# Patient Record
Sex: FEMALE | Race: WHITE | NOT HISPANIC OR LATINO | Employment: OTHER | URBAN - NONMETROPOLITAN AREA
[De-identification: names, ages, dates, MRNs, and addresses within clinical notes are randomized per-mention and may not be internally consistent; named-entity substitution may affect disease eponyms.]

---

## 2021-04-28 ENCOUNTER — HOSPITAL ENCOUNTER (OUTPATIENT)
Dept: RADIOLOGY | Facility: CLINIC | Age: 66
Discharge: HOME | End: 2021-04-28
Payer: COMMERCIAL

## 2021-04-28 ENCOUNTER — OFFICE VISIT (OUTPATIENT)
Dept: OBGYN CLINIC | Facility: CLINIC | Age: 66
End: 2021-04-28
Payer: COMMERCIAL

## 2021-04-28 VITALS
SYSTOLIC BLOOD PRESSURE: 140 MMHG | BODY MASS INDEX: 42.95 KG/M2 | WEIGHT: 290 LBS | DIASTOLIC BLOOD PRESSURE: 82 MMHG | HEART RATE: 96 BPM | TEMPERATURE: 96.8 F | HEIGHT: 69 IN

## 2021-04-28 DIAGNOSIS — G89.29 CHRONIC PAIN OF RIGHT KNEE: ICD-10-CM

## 2021-04-28 DIAGNOSIS — E66.01 CLASS 3 SEVERE OBESITY DUE TO EXCESS CALORIES WITH SERIOUS COMORBIDITY AND BODY MASS INDEX (BMI) OF 40.0 TO 44.9 IN ADULT (HCC): ICD-10-CM

## 2021-04-28 DIAGNOSIS — M25.561 CHRONIC PAIN OF RIGHT KNEE: Primary | ICD-10-CM

## 2021-04-28 DIAGNOSIS — G89.29 CHRONIC RIGHT SHOULDER PAIN: ICD-10-CM

## 2021-04-28 DIAGNOSIS — M25.561 CHRONIC PAIN OF RIGHT KNEE: ICD-10-CM

## 2021-04-28 DIAGNOSIS — M25.511 CHRONIC RIGHT SHOULDER PAIN: ICD-10-CM

## 2021-04-28 DIAGNOSIS — G89.29 CHRONIC PAIN OF RIGHT KNEE: Primary | ICD-10-CM

## 2021-04-28 DIAGNOSIS — M75.01 ADHESIVE CAPSULITIS OF RIGHT SHOULDER: ICD-10-CM

## 2021-04-28 DIAGNOSIS — M17.11 PRIMARY OSTEOARTHRITIS OF RIGHT KNEE: ICD-10-CM

## 2021-04-28 PROBLEM — E66.813 CLASS 3 SEVERE OBESITY DUE TO EXCESS CALORIES WITH SERIOUS COMORBIDITY AND BODY MASS INDEX (BMI) OF 40.0 TO 44.9 IN ADULT (HCC): Status: ACTIVE | Noted: 2021-04-28

## 2021-04-28 PROCEDURE — 73564 X-RAY EXAM KNEE 4 OR MORE: CPT

## 2021-04-28 PROCEDURE — 73030 X-RAY EXAM OF SHOULDER: CPT

## 2021-04-28 PROCEDURE — 20610 DRAIN/INJ JOINT/BURSA W/O US: CPT | Performed by: ORTHOPAEDIC SURGERY

## 2021-04-28 PROCEDURE — 99204 OFFICE O/P NEW MOD 45 MIN: CPT | Performed by: ORTHOPAEDIC SURGERY

## 2021-04-28 RX ORDER — LOSARTAN POTASSIUM 50 MG/1
1 TABLET ORAL DAILY
COMMUNITY
Start: 2021-02-08

## 2021-04-28 RX ORDER — TRIAMCINOLONE ACETONIDE 40 MG/ML
40 INJECTION, SUSPENSION INTRA-ARTICULAR; INTRAMUSCULAR
Status: COMPLETED | OUTPATIENT
Start: 2021-04-28 | End: 2021-04-28

## 2021-04-28 RX ORDER — PANTOPRAZOLE SODIUM 40 MG/1
1 TABLET, DELAYED RELEASE ORAL DAILY
COMMUNITY
Start: 2021-02-11

## 2021-04-28 RX ORDER — LIDOCAINE HYDROCHLORIDE 10 MG/ML
4 INJECTION, SOLUTION INFILTRATION; PERINEURAL
Status: COMPLETED | OUTPATIENT
Start: 2021-04-28 | End: 2021-04-28

## 2021-04-28 RX ORDER — CITALOPRAM 20 MG/1
1 TABLET ORAL 2 TIMES DAILY
COMMUNITY
Start: 2021-03-15

## 2021-04-28 RX ORDER — HYDROCHLOROTHIAZIDE 12.5 MG/1
1 TABLET ORAL DAILY
COMMUNITY
Start: 2021-02-08

## 2021-04-28 RX ADMIN — LIDOCAINE HYDROCHLORIDE 4 ML: 10 INJECTION, SOLUTION INFILTRATION; PERINEURAL at 16:36

## 2021-04-28 RX ADMIN — TRIAMCINOLONE ACETONIDE 40 MG: 40 INJECTION, SUSPENSION INTRA-ARTICULAR; INTRAMUSCULAR at 16:36

## 2021-04-28 RX ADMIN — LIDOCAINE HYDROCHLORIDE 4 ML: 10 INJECTION, SOLUTION INFILTRATION; PERINEURAL at 16:35

## 2021-04-28 RX ADMIN — TRIAMCINOLONE ACETONIDE 40 MG: 40 INJECTION, SUSPENSION INTRA-ARTICULAR; INTRAMUSCULAR at 16:35

## 2021-04-28 NOTE — PROGRESS NOTES
ASSESSMENT/PLAN:    Diagnoses and all orders for this visit:    Chronic pain of right knee  -     XR knee 4+ vw right injury; Future    Chronic right shoulder pain  -     XR shoulder 2+ vw right; Future  -     Ambulatory referral to Physical Therapy; Future    Adhesive capsulitis of right shoulder  -     Ambulatory referral to Physical Therapy; Future    Primary osteoarthritis of right knee  -     Ambulatory referral to Physical Therapy; Future    Obesity      Plan:  I discussed treatment options  She elected to proceed with corticosteroid injection of both the right shoulder and right knee  These were performed without difficulty  I will see her in 2 or 3 weeks for re-evaluation  Physical therapy was recommended and a prescription provided  She will contact the office if questions or concerns arise prior to follow-up  She has previously had Visco supplement injection without significant benefit and, therefore, this would not likely the of benefit at this time  However, if she does not respond to corticosteroid injection a Visco supplement injection would be an option to discuss as her BMI is greater than 40 and she would not be a candidate for knee replacement arthroplasty  Return for 2-3 weeks  _____________________________________________________  CHIEF COMPLAINT:  Chief Complaint   Patient presents with    Right Knee - Pain    Right Shoulder - Pain         SUBJECTIVE:  Shivam Fermin is a 77y o  year old right-hand-dominant female who presents for evaluation of right knee and right shoulder pain  In regards to her right knee pain, she reports a history of pain on an intermittent basis dating back at least 10 years  She has previously been seen by Orthopedics while living in [place]  She has had corticosteroid injections with some relief and Visco supplement injection without benefit  She was told she had patellofemoral arthritis   she was placed on anti-inflammatory medications which seem to provide benefit but were discontinued secondary to peptic ulcer disease  Over the last 3 years, since anti-inflammatories were discontinued, symptoms seem to be more frequent but was still intermittent  Approximately 4 months ago, she began experiencing more consistent pain which has persisted and is now interfering with activity  She states she cannot walk more than 30 minutes without having to sit and rest due to pain  She denies pain at rest   She complains of diffuse peripatellar pain with occasional anterolateral calf pain  She does admit to a diagnosis of sciatica which makes it difficult for her to determine if the calf pain is related to her knee or her sciatic symptoms  She denies lower extremity paresthesias  She notes morning stiffness and pain  she has had no recent treatment or diagnostic studies  In regards to her right shoulder, she has had symptoms of shoulder pain for the last year  Symptoms seem to be more prominent recently  She noted a diagnosis of adhesive capsulitis approximately 10 years ago which was treated with therapy  She subsequently developed some left shoulder symptoms which she did not seek treatment for and resolved after a couple of years  Her right shoulder pain is present with activity but not present at rest   She denies any right upper extremity paresthesias  She is unaware of any injury that may have triggered her recent symptoms  She has had no treatment or diagnostic studies        PAST MEDICAL HISTORY:  Past Medical History:   Diagnosis Date    Anxiety     Arthritis     knees and feet    Depression     Hypertension     Sciatica     Stomach ulcer        PAST SURGICAL HISTORY:  Past Surgical History:   Procedure Laterality Date    APPENDECTOMY      CHOLECYSTECTOMY      GASTRIC BYPASS      HERNIA REPAIR         FAMILY HISTORY:  Family History   Problem Relation Age of Onset    Hypertension Mother     Heart attack Mother     Stroke Mother    Althea Mccloud Hypertension Father     Heart attack Father        SOCIAL HISTORY:  Social History     Tobacco Use    Smoking status: Never Smoker    Smokeless tobacco: Never Used   Substance Use Topics    Alcohol use: Yes     Frequency: Monthly or less    Drug use: Never       MEDICATIONS:    Current Outpatient Medications:     citalopram (CeleXA) 20 mg tablet, Take 1 tablet by mouth 2 (two) times a day, Disp: , Rfl:     Cyanocobalamin (VITAMIN B-12 PO), Take by mouth, Disp: , Rfl:     hydrochlorothiazide (HYDRODIURIL) 12 5 mg tablet, Take 1 tablet by mouth daily, Disp: , Rfl:     IRON-VITAMIN C PO, Take by mouth, Disp: , Rfl:     losartan (COZAAR) 50 mg tablet, Take 1 tablet by mouth daily, Disp: , Rfl:     Multiple Minerals-Vitamins (CALCIUM CITRATE PLUS PO), Take by mouth, Disp: , Rfl:     pantoprazole (PROTONIX) 40 mg tablet, Take 1 tablet by mouth daily, Disp: , Rfl:     VITAMIN D PO, Take by mouth, Disp: , Rfl:     ALLERGIES:  No Known Allergies    Review of systems:   Constitutional: Negative for fatigue, fever or loss of apetite  HENT: Negative  Respiratory: Negative for shortness of breath, dyspnea  Cardiovascular: Negative for chest pain/tightness  Gastrointestinal: Negative for abdominal pain, N/V  Endocrine: Negative for cold/heat intolerance, unexplained weight loss/gain  Genitourinary: Negative for flank pain, dysuria, hematuria  Musculoskeletal:  Positive as in the HPI   Skin: Negative for rash  Neurological:  Negative  Psychiatric/Behavioral: Negative for agitation  _____________________________________________________  PHYSICAL EXAMINATION:    Blood pressure 140/82, pulse 96, temperature (!) 96 8 °F (36 °C), temperature source Temporal, height 5' 9" (1 753 m), weight 132 kg (290 lb)      General: well developed and well nourished, alert, oriented times 3 and appears comfortable  Psychiatric: Normal  HEENT: Benign  Cardiovascular: Regular    Pulmonary: No wheezing or stridor  Abdomen: Soft, Nontender  Skin: No masses, erythema, lacerations, fluctation, ulcerations  Neurovascular: Motor and sensory exams are intact and pulses are palpable  radicular signs are absent  MUSCULOSKELETAL EXAMINATION:    The right knee exam demonstrates range of motion from 5° to 110°, primarily limited by body habitus  She denies pain during passive motion with mild pain noted during active motion  There is some mild patellofemoral crepitus appreciated  She has no tenderness over the patella, patellar tendon or tibial tubercle  The medial and lateral joint lines were nontender  Femoral condyle and tibial plateau were nontender  Collateral cruciate ligaments are stable  There is no obvious effusion  The right shoulder exam demonstrates abduction to approximately 130° without tendency toward forward flexion  She has 140° of forward flexion  She denies any pain with range of motion but does notice stretching sensation down the lateral aspect of her arm  She is able to reach the posterior portion of the iliac crest during abduction/IR verses the upper lumbar spine on the contralateral left  She has excellent strength of resisted internal and external rotation as well as abduction, flexion and extension  she denies symptoms during Gracy's testing but does complain of mild pain during [place]'s testing  Passively, I was able to abduct the arm to about 60°, forward flex to about 90°, internally rotates to 60° and externally rotate approximately 60°  She does have some apprehension during passive motion but denies significant pain except at end range abduction  _____________________________________________________  STUDIES REVIEWED:  X-rays of the right shoulder and right knee were reviewed    X-rays of the right knee demonstrate moderate to significant degenerative disease with significant narrowing of the medial joint space, lesser narrowing laterally, osteophytes and subchondral sclerosis about the tibial femoral joint  There are degenerative changes noted about the patellofemoral joint as well with osteophytes, subchondral sclerosis and joint space narrowing  The right shoulder x-ray demonstrates some degenerative disease at the acromioclavicular joint  There is some degenerative disease at the glenohumeral joint but to a lesser degree  She has some prominence noted at the tuberosity level of the proximal humerus  PROCEDURES:  Large joint arthrocentesis: R knee  Universal Protocol:  Consent: Verbal consent obtained  Consent given by: patient  Patient understanding: patient states understanding of the procedure being performed    Supporting Documentation  Indications: pain   Procedure Details  Location: knee - R knee  Needle size: 22 G  Ultrasound guidance: no  Approach: lateral  Medications administered: 4 mL lidocaine 1 %; 40 mg triamcinolone acetonide 40 mg/mL      Large joint arthrocentesis: R subacromial bursa  Universal Protocol:  Consent: Verbal consent obtained    Consent given by: patient  Patient understanding: patient states understanding of the procedure being performed    Supporting Documentation  Indications: pain   Procedure Details  Location: shoulder - R subacromial bursa  Needle size: 22 G  Approach: posterolateral  Medications administered: 4 mL lidocaine 1 %; 40 mg triamcinolone acetonide 40 mg/mL              Liana Kothari

## 2021-05-24 ENCOUNTER — OFFICE VISIT (OUTPATIENT)
Dept: OBGYN CLINIC | Facility: CLINIC | Age: 66
End: 2021-05-24
Payer: COMMERCIAL

## 2021-05-24 VITALS
WEIGHT: 290 LBS | SYSTOLIC BLOOD PRESSURE: 124 MMHG | BODY MASS INDEX: 42.95 KG/M2 | TEMPERATURE: 97 F | DIASTOLIC BLOOD PRESSURE: 78 MMHG | HEART RATE: 74 BPM | HEIGHT: 69 IN

## 2021-05-24 DIAGNOSIS — M17.11 PRIMARY OSTEOARTHRITIS OF RIGHT KNEE: Primary | ICD-10-CM

## 2021-05-24 PROCEDURE — 99213 OFFICE O/P EST LOW 20 MIN: CPT | Performed by: ORTHOPAEDIC SURGERY

## 2021-05-24 NOTE — PROGRESS NOTES
ASSESSMENT/PLAN:    Diagnoses and all orders for this visit:    Primary osteoarthritis of right knee  -     Diclofenac Sodium (VOLTAREN) 1 %; Apply 2 g topically 4 (four) times a day        Treatment options were discussed with the patient including visco injections  At this time, she does not wish to try visco as she previously tried them in [place] and did not have any pain relief with it  She will be starting PT this week  She inquired about topical NSAIDs and a prescription for Voltaren gel was provided  She was informed that this medication is now OTC and her insurance may not cover it  She was also informed that she can take up to 3000mg Tylenol per day  In regards to her sciatica symptoms, she was encouraged to seek an evaluation with Dr Aleah Garcia  We will see her back on an as needed basis  She was encouraged to contact the office should questions or concerns arise  Return if symptoms worsen or fail to improve       _____________________________________________________  CHIEF COMPLAINT:  Chief Complaint   Patient presents with    Follow-up         SUBJECTIVE:  Jess Jarquin is a 77 y o  female who presents for follow up  Of right knee osteoarthritis  She has 2 weeks status post right knee corticosteroid injection  She reports moderate relief of her knee symptoms  She does not take anything for pain on a regular basis  She is unable to take NSAIDs due to a history of gastric ulcers and gastric bypass  She reports her pain is on the medial aspect of her knee  She also experiences sciatic type symptoms which she states are occurring more frequently than not  She does not see anyone for her back  She has yet to go to physical therapy but states she has an appointment this week  She denies new injuries or trauma      PAST MEDICAL HISTORY:  Past Medical History:   Diagnosis Date    Anxiety     Arthritis     knees and feet    Depression     Hypertension     Sciatica     Stomach ulcer        PAST SURGICAL HISTORY:  Past Surgical History:   Procedure Laterality Date    APPENDECTOMY      CHOLECYSTECTOMY      GASTRIC BYPASS      HERNIA REPAIR         FAMILY HISTORY:  Family History   Problem Relation Age of Onset    Hypertension Mother     Heart attack Mother     Stroke Mother     Hypertension Father     Heart attack Father        SOCIAL HISTORY:  Social History     Tobacco Use    Smoking status: Never Smoker    Smokeless tobacco: Never Used   Substance Use Topics    Alcohol use: Yes     Frequency: Monthly or less    Drug use: Never       MEDICATIONS:    Current Outpatient Medications:     citalopram (CeleXA) 20 mg tablet, Take 1 tablet by mouth 2 (two) times a day, Disp: , Rfl:     Cyanocobalamin (VITAMIN B-12 PO), Take by mouth, Disp: , Rfl:     hydrochlorothiazide (HYDRODIURIL) 12 5 mg tablet, Take 1 tablet by mouth daily, Disp: , Rfl:     IRON-VITAMIN C PO, Take by mouth, Disp: , Rfl:     losartan (COZAAR) 50 mg tablet, Take 1 tablet by mouth daily, Disp: , Rfl:     Multiple Minerals-Vitamins (CALCIUM CITRATE PLUS PO), Take by mouth, Disp: , Rfl:     pantoprazole (PROTONIX) 40 mg tablet, Take 1 tablet by mouth daily, Disp: , Rfl:     VITAMIN D PO, Take by mouth, Disp: , Rfl:     Diclofenac Sodium (VOLTAREN) 1 %, Apply 2 g topically 4 (four) times a day, Disp: 240 g, Rfl: 0    ALLERGIES:  No Known Allergies    REVIEW OF SYSTEMS:  Pertinent items are noted in HPI  A comprehensive review of systems was negative       _____________________________________________________  PHYSICAL EXAMINATION:  General: well developed and well nourished, alert, oriented times 3 and appears comfortable  Psychiatric: Normal  HEENT:  Normocephalic, atraumatic  Cardiovascular:  Regular  Pulmonary: No wheezing or stridor  Skin: No masses, erthema, lacerations, fluctation, ulcerations  Neurovascular: Motor and sensory exams are grossly intact, distal pulses are palpable    Limb is warm and well perfused good color and capillary refill the toes  MUSCULOSKELETAL EXAMINATION:      The right knee exam demonstrates skin intact, no erythema, no ecchymosis, no significant swelling or effusion  She has tenderness to palpation over the medial femoral condyle medial tibial plateau, and medial joint line  She has full extension and flexion is to about 120°  Stable to varus and valgus stress, cruciate ligaments are grossly stable  The remainder of the right lower extremity exam is benign  _____________________________________________________  STUDIES REVIEWED:  No new imaging performed today    PROCEDURES PERFORMED:   Procedures  None performed today            Libby Dial PA-C

## 2021-05-25 ENCOUNTER — EVALUATION (OUTPATIENT)
Dept: PHYSICAL THERAPY | Facility: CLINIC | Age: 66
End: 2021-05-25
Payer: COMMERCIAL

## 2021-05-25 DIAGNOSIS — M75.01 ADHESIVE CAPSULITIS OF RIGHT SHOULDER: ICD-10-CM

## 2021-05-25 DIAGNOSIS — G89.29 CHRONIC RIGHT SHOULDER PAIN: ICD-10-CM

## 2021-05-25 DIAGNOSIS — M25.511 CHRONIC RIGHT SHOULDER PAIN: ICD-10-CM

## 2021-05-25 DIAGNOSIS — M17.11 PRIMARY OSTEOARTHRITIS OF RIGHT KNEE: ICD-10-CM

## 2021-05-25 PROCEDURE — 97162 PT EVAL MOD COMPLEX 30 MIN: CPT | Performed by: PHYSICAL THERAPIST

## 2021-05-25 PROCEDURE — 97110 THERAPEUTIC EXERCISES: CPT | Performed by: PHYSICAL THERAPIST

## 2021-05-25 NOTE — PROGRESS NOTES
PT Evaluation     Today's date: 2021  Patient name: Araceli Griffin  : 1955  MRN: [id number]  Referring provider: Vladislav Kim DO  Dx:   Encounter Diagnosis     ICD-10-CM    1  Chronic right shoulder pain  M25 511 Ambulatory referral to Physical Therapy    G89 [id number]    2  Adhesive capsulitis of right shoulder  M75 01 Ambulatory referral to Physical Therapy   3  Primary osteoarthritis of right knee  M17 11 Ambulatory referral to Physical Therapy                  Assessment  Assessment details: Araceli Griffin is a 77 y o  female presenting to PT with pain, decreased knee range of motion, decreased LE strength, balance deficits, and decreased tolerance to activity  She complains of R shoulder pain from time to time that limits her ability to dress and perform OH motions  Upon examination, frozen shoulder possible in R shoulder due to limited motion and OA in R knee  Due to these impairments, pt has difficulty with walking, stair climbing, performing ADL's, reaching OH  Patient would benefit from skilled PT services to address these impairments and to maximize function in order to improve quality of life  Thank you for the referral   Impairments: abnormal gait, abnormal or restricted ROM, impaired physical strength, lacks appropriate home exercise program and pain with function    Symptom irritability: moderateUnderstanding of Dx/Px/POC: excellent  Goals  STG  1)R knee ROM will improve >10 deg  in 3 weeks  2)R knee strength will improve 1/2 grade in 3 weeks  3) Pt will report a decrease in pain levels at its worst by 2-3 in 3 weeks  4) R shoulder ROM will improve by 5-10 deg  In all deficient planes in 3 weeks  LTG  1) Patient is independent in HEP  2) Patient will ascend/descend one flight of stairs independently with no increased pain in 6 weeks  3) Pt will be able to reach [facility] with R shoulder without increased pain or difficulty by DC    4) Pt's strength in R shoulder will be equal to contralateral side in 6 weeks  5) Pt will report no sleep disturbances due to pain in 6 weeks  6) Pt's R shoulder ROM will be equal to contralateral side by DC  Plan  Patient would benefit from: skilled physical therapy  Planned modality interventions: cryotherapy  Planned therapy interventions: breathing training, body mechanics training, massage, manual therapy, joint mobilization, abdominal trunk stabilization, balance, patient education, strengthening, flexibility, functional ROM exercises, gait training, therapeutic activities, therapeutic exercise, stretching and home exercise program  Frequency: 2x week  Duration in weeks: 4  Treatment plan discussed with: patient        Subjective Evaluation    History of Present Illness  Mechanism of injury: Pt notes that she has bene having knee pain for a few months now  She did get a Cortizone injection a few weeks ago and that has helped her pain  She notes that she is dealing with sciatic like pain down her R leg as well which is not helping her symptoms  She notes that she has been dealing with R shoulder pain for a few weeks now  She went to PT about 10 years ago for frozen shoulder and it helped, but does come back every few years  She states that [facility] motions are challenging for her and does have muscular pain in her neck and lateral arm on the right  She did get an injection in her R shoulder a few weeks ago as well, but notes it didn't really help as much  She notes that putting a bra on is difficult and there are nights that she cannot sleep on her R side  She notes that reaching behind her is challenging as well  She has trouble looking side to side, especially on her L side due to tightness in her neck  Currently, walking long distances or hills are painful for her  Stair climbing is hard for her, she does one step at a time and takes her time  She cannot extend her knee straight because she feels a lot of tightness in her knee     Quality of life: excellent    Pain  Current pain ratin  At best pain ratin  At worst pain ratin  Location: R Knee joint, under knee cap   Quality: throbbing  Relieving factors: heat and ice  Aggravating factors: standing, walking, stair climbing and sitting    Social Support  Steps to enter house: yes (1 step in)  Stairs in house: no   L[facility] in: [facility]  Lives with: spouse and parents    Employment status: not working  Hand dominance: right      Diagnostic Tests  X-ray: abnormal (arthritis in knee and shoulder )  Treatments  No previous or current treatments  Patient Goals  Patient goals for therapy: decreased pain, improved balance, increased strength and independence with ADLs/IADLs  Patient goal: "I would like to have less pain and get around a little better "        Objective     Postural Observations  Seated posture: poor  Standing posture: fair        Tenderness     Right Knee   Tenderness in the lateral joint line and medial joint line       Additional Tenderness Details  Hamstring flexibility: 75% limited on R     Cervical/Thoracic Screen   Cervical range of motion within normal limits with the following exceptions: Rotation to the L: 50% restricted  SB to the L: 50% restricted      Neurological Testing     Sensation     Shoulder   Left Shoulder   Intact: light touch    Right Shoulder   Intact: light touch    Knee   Left Knee   Intact: light touch    Right Knee   Intact: light touch     Active Range of Motion   Left Shoulder   Normal active range of motion  External rotation BTH: C4   Internal rotation BTB: T12     Right Shoulder   Flexion: 115 degrees with pain  Abduction: 110 degrees with pain  External rotation BTH: C3   Internal rotation BTB: sacrum   Left Knee   Normal active range of motion    Right Knee   Flexion: 100 (tightness) degrees   Extensor la degrees with pain    Strength/Myotome Testing     Left Shoulder   Normal muscle strength    Right Shoulder     Planes of Motion   Flexion: 4 Abduction: 4   External rotation at 90°: 4-   Internal rotation at 90°: 4     Left Hip   Planes of Motion   Flexion: 4+  Abduction: 4+  Adduction: 4+    Right Hip   Planes of Motion   Flexion: 4  Abduction: 4+  Adduction: 4+    Left Knee   Normal strength  Quadriceps contraction: good    Right Knee   Flexion: 4-  Extension: 4  Quadriceps contraction: fair    Left Ankle/Foot   Normal strength    Right Ankle/Foot   Normal strength    Tests     Right Shoulder   Positive Hawkin's  Negative drop arm and empty can  Precautions: anxiety, depression, HTN     Daily Treatment Diary:      Initial Evaluation Date: 05/25/21  Compliance 5/25                     Visit Number 1                    Re-Eval  IE                 [address]                           5/25                     Manual                      Knee PROM                      STM                      Patellar mobs                                     Ther-Ex                      Bike- warm up                      Heel slides HEP                     SAQ                      Calf stretch  HEP                     HS stretch  HEP                     bridge                      SLR- 2 ways                       clamshells                      HR/TR                      Mini squats              LAQ             HS curls              Sciatic nerve glides             Supine shoulder flexion cane HEP            Cervical ROM 4 ways HEP            Upper trap stretch  HEP                                                            Neuro Re-Ed                      Balance- TS                      Balance- narros YONATHAN                                                    Ther-Act                                                               Modalities

## 2021-05-28 DIAGNOSIS — N18.31 CHRONIC KIDNEY DISEASE, STAGE 3A (HCC): ICD-10-CM

## 2021-05-28 DIAGNOSIS — Z12.31 ENCOUNTER FOR SCREENING MAMMOGRAM FOR MALIGNANT NEOPLASM OF BREAST: ICD-10-CM

## 2021-06-01 ENCOUNTER — OFFICE VISIT (OUTPATIENT)
Dept: PHYSICAL THERAPY | Facility: CLINIC | Age: 66
End: 2021-06-01
Payer: COMMERCIAL

## 2021-06-01 DIAGNOSIS — G89.29 CHRONIC RIGHT SHOULDER PAIN: Primary | ICD-10-CM

## 2021-06-01 DIAGNOSIS — M17.11 PRIMARY OSTEOARTHRITIS OF RIGHT KNEE: ICD-10-CM

## 2021-06-01 DIAGNOSIS — M25.511 CHRONIC RIGHT SHOULDER PAIN: Primary | ICD-10-CM

## 2021-06-01 DIAGNOSIS — M75.01 ADHESIVE CAPSULITIS OF RIGHT SHOULDER: ICD-10-CM

## 2021-06-01 PROCEDURE — 97110 THERAPEUTIC EXERCISES: CPT | Performed by: PHYSICAL THERAPIST

## 2021-06-01 NOTE — PROGRESS NOTES
Daily Note     Today's date: 2021  Patient name: Yuriy Beauchamp  : 1955  MRN: [id number]  Referring provider: Drew Pat DO  Dx:   Encounter Diagnosis     ICD-10-CM    1  Chronic right shoulder pain  M25 511     G89 29    2  Adhesive capsulitis of right shoulder  M75 01    3  Primary osteoarthritis of right knee  M17 11                   Subjective: Pt notes that her shoulder and knee have been feeling better since her IE  Objective: See treatment diary below      Assessment: Tolerated treatment well  Introduced new exercises to promote strength and ROM in LE, she noted some difficulty with SLR, but was able to perform  Pt needed moderate cueing for proper form and intensity  Continue to progress exercises as see fit  Patient exhibited good technique with therapeutic exercises and would benefit from continued PT to address current limitations  Plan: Continue per plan of care  Precautions: anxiety, depression, HTN     Daily Treatment Diary:      Initial Evaluation Date: 21  Compliance                    Visit Number 1 2                   Re-Eval  IE -                MC   [employer] Captured Y -                                             Manual                      Knee PROM   arb                   STM   -                   Patellar mobs  arb           R shoulder PROM  arb                   Ther-Ex                      Bike- warm up   -                   Heel slides HEP  20x                   SAQ   2x10                   Calf stretch  HEP  -                   HS stretch  HEP  -                   bridge   -                   SLR- 2 ways    flex   15x                   clamshells   green 2x10                   HR/TR   -                   Mini squats   -           LAQ  20x           HS curls   -           Sciatic nerve glides  -           Supine shoulder flexion cane HEP 20x            Cervical ROM 4 ways HEP 10x ea           Upper trap stretch  HEP 3x30" pullys  5'            Finger ladder   10x           Shoulder squeeze  2x10 5"                   Neuro Re-Ed                      Balance- TS   -                   Balance- narros YONATHAN  -                                                  Ther-Act                                                               Modalities

## 2021-06-08 ENCOUNTER — APPOINTMENT (OUTPATIENT)
Dept: PHYSICAL THERAPY | Facility: CLINIC | Age: 66
End: 2021-06-08
Payer: COMMERCIAL

## 2021-06-14 ENCOUNTER — OFFICE VISIT (OUTPATIENT)
Dept: PHYSICAL THERAPY | Facility: CLINIC | Age: 66
End: 2021-06-14
Payer: COMMERCIAL

## 2021-06-14 DIAGNOSIS — M25.511 CHRONIC RIGHT SHOULDER PAIN: Primary | ICD-10-CM

## 2021-06-14 DIAGNOSIS — M17.11 PRIMARY OSTEOARTHRITIS OF RIGHT KNEE: ICD-10-CM

## 2021-06-14 DIAGNOSIS — M75.01 ADHESIVE CAPSULITIS OF RIGHT SHOULDER: ICD-10-CM

## 2021-06-14 DIAGNOSIS — G89.29 CHRONIC RIGHT SHOULDER PAIN: Primary | ICD-10-CM

## 2021-06-14 PROCEDURE — 97110 THERAPEUTIC EXERCISES: CPT | Performed by: PHYSICAL THERAPIST

## 2021-06-14 NOTE — PROGRESS NOTES
Daily Note     Today's date: 2021  Patient name: Anila Beltran  : 1955  MRN: [id number]  Referring provider: Dwain Peres DO  Dx:   Encounter Diagnosis     ICD-10-CM    1  Chronic right shoulder pain  M25 511     G89 29    2  Adhesive capsulitis of right shoulder  M75 01    3  Primary osteoarthritis of right knee  M17 11                   Subjective: Pt notes that her motion in her shoulder is much better, but she continues to have a lot of stiffness and pain with her R knee still  States that she thinks the Cortizone shot is starting to decrease  Objective: See treatment diary below      Assessment: Tolerated treatment well  Pt's R shoulder ROM was improved today without increased pain  Added mini squats today, she was able to perform with minimal discomfort in knee  She needed moderate cueing for proper form throughout session  Patient exhibited good technique with therapeutic exercises and would benefit from continued PT to address current limitations  Plan: Continue per plan of care  Precautions: anxiety, depression, HTN     Daily Treatment Diary:      Initial Evaluation Date: 21  Compliance                  Visit Number 1 2  3                 Re-Eval  IE -  -              MC   Foto Captured Y -  -                                         Manual                      Knee PROM   arb  def                 STM   -                   Patellar mobs  arb def          R shoulder PROM  arb  def                 Ther-Ex                      Bike- warm up   -  -                 Heel slides HEP  20x  -                 SAQ   2x10  2x10 #1                 Calf stretch  HEP  -  -                 HS stretch  HEP  -  -                 bridge   -  -                 SLR- 2 ways    flex   15x  flex 10x #1                 clamshells   green 2x10  green 2x10                 HR/TR   -                   Mini squats   - 15x at bar          LAQ  20x 20x #1          HS curls   - -          Wall slides  - 15x flex  Supine shoulder flexion cane HEP 20x  20x #1- added abduction          Wall slides   Flex   15x          Cervical ROM 4 ways HEP 10x ea 15x ea          Upper trap stretch  HEP 3x30" 3x30"          pullys  5'  5'           Finger ladder   10x 15x          Shoulder squeeze  2x10 5"  -                 Neuro Re-Ed                      Balance- TS   -                   Balance- narros YONATHAN  -                                                  Ther-Act                                                               Modalities

## 2021-06-15 ENCOUNTER — APPOINTMENT (OUTPATIENT)
Dept: PHYSICAL THERAPY | Facility: CLINIC | Age: 66
End: 2021-06-15
Payer: COMMERCIAL

## 2021-06-22 ENCOUNTER — APPOINTMENT (OUTPATIENT)
Dept: PHYSICAL THERAPY | Facility: CLINIC | Age: 66
End: 2021-06-22
Payer: COMMERCIAL

## 2021-06-24 ENCOUNTER — HOSPITAL ENCOUNTER (OUTPATIENT)
Dept: RADIOLOGY | Facility: CLINIC | Age: 66
Discharge: HOME | End: 2021-06-24
Payer: COMMERCIAL

## 2021-06-24 VITALS — HEIGHT: 69 IN | BODY MASS INDEX: 42.95 KG/M2 | WEIGHT: 290 LBS

## 2021-06-24 DIAGNOSIS — Z12.31 ENCOUNTER FOR SCREENING MAMMOGRAM FOR MALIGNANT NEOPLASM OF BREAST: ICD-10-CM

## 2021-06-24 PROCEDURE — 77063 BREAST TOMOSYNTHESIS BI: CPT

## 2021-06-24 PROCEDURE — 77067 SCR MAMMO BI INCL CAD: CPT

## 2021-06-29 ENCOUNTER — EVALUATION (OUTPATIENT)
Dept: PHYSICAL THERAPY | Facility: CLINIC | Age: 66
End: 2021-06-29
Payer: COMMERCIAL

## 2021-06-29 DIAGNOSIS — G89.29 CHRONIC RIGHT SHOULDER PAIN: Primary | ICD-10-CM

## 2021-06-29 DIAGNOSIS — M17.11 PRIMARY OSTEOARTHRITIS OF RIGHT KNEE: ICD-10-CM

## 2021-06-29 DIAGNOSIS — M25.511 CHRONIC RIGHT SHOULDER PAIN: Primary | ICD-10-CM

## 2021-06-29 DIAGNOSIS — M75.01 ADHESIVE CAPSULITIS OF RIGHT SHOULDER: ICD-10-CM

## 2021-06-29 PROCEDURE — 97110 THERAPEUTIC EXERCISES: CPT | Performed by: PHYSICAL THERAPIST

## 2021-06-29 NOTE — PROGRESS NOTES
Re-Evaluation/DC Summary     Today's date: 2021  Patient name: Claudean Maffucci  : 1955  MRN: [id number]  Referring provider: Selma Mandel DO  Dx:   Encounter Diagnosis     ICD-10-CM    1  Chronic right shoulder pain  M25 511     G89 29    2  Adhesive capsulitis of right shoulder  M75 01    3  Primary osteoarthritis of right knee  M17 11                   Subjective: Pt presents to PT today for re-evaluation for chronic R shoulder and knee pain  Pt notes that her shoulder motion, pain levels in arm, and function has improved since starting PT  She continues to have some difficulty with OH motion, but has been trying to use her R arm to do everyday activities  She states that her knee is still painful with walking, stair climbing and getting in and out of a chair  She notes that most of her pain is medially and under her knee cap  Pt is going to follow up with doctor to see if she can get another shot soon  Pt is starting another therapy in the future and will have a copay with that as well; would like to transition into an HEP at this time and DC from therapy  Objective: See treatment diary below    Goals  STG  1)R knee ROM will improve >10 deg  in 3 weeks  -met  2)R knee strength will improve 1/2 grade in 3 weeks  -met  3) Pt will report a decrease in pain levels at its worst by 2-3 in 3 weeks  -met  4) R shoulder ROM will improve by 5-10 deg  In all deficient planes in 3 weeks  - met    LTG  1) Patient is independent in HEP  -met  2) Patient will ascend/descend one flight of stairs independently with no increased pain in 6 weeks  -not met  3) Pt will be able to reach [facility] with R shoulder without increased pain or difficulty by DC -in progress  4) Pt's strength in R shoulder will be equal to contralateral side in 6 weeks  -not met  5) Pt will report no sleep disturbances due to pain in 6 weeks   -not met  6) Pt's R shoulder ROM will be equal to contralateral side by DC  - not met      Tenderness Right Knee   Tenderness in the medial joint line and below patella    Additional Tenderness Details  Hamstring flexibility: 50% limited on R     Cervical/Thoracic Screen   Motion: [place]    Neurological Testing     Sensation     Shoulder   Left Shoulder   Intact: light touch    Right Shoulder   Intact: light touch    Knee   Left Knee   Intact: light touch    Right Knee   Intact: light touch     Active Range of Motion   Left Shoulder   Normal active range of motion  External rotation BTH: C4   Internal rotation BTB: T12     Right Shoulder   Flexion: 135 degrees  Abduction: 130 degrees  External rotation BTH: C4  Internal rotation BTB: L5    Left Knee   Normal active range of motion    Right Knee   Flexion: 113 degrees   Extensor la degrees with pain    Strength/Myotome Testing     Left Shoulder   Normal muscle strength    Right Shoulder     Planes of Motion   Flexion: 4   Abduction: 4   External rotation at 90°: 4  Internal rotation at 90°: 4     Left Hip   Planes of Motion   Flexion: 4+  Abduction: 4+  Adduction: 4+    Right Hip   Planes of Motion   Flexion: 4  Abduction: 4+  Adduction: 4+    Left Knee   Normal strength  Quadriceps contraction: good    Right Knee   Flexion: 4  Extension: 4  Quadriceps contraction: fair    Left Ankle/Foot   Normal strength    Right Ankle/Foot   Normal strength    Tests     Right Shoulder   Positive Hawkin's  Negative drop arm and empty can  Assessment: Tolerated treatment well  She continues to have weakness in her R LE compared to her L side, but has improved since starting PT  Reviewed HEP with patient, no questions at this time  Pt is comfortable calling clinic with any future questions or concerns  Plan: Pt to be DC'd from formal therapy at this time and will continue HEP           Precautions: anxiety, depression, HTN     Daily Treatment Diary:      Initial Evaluation Date: 21  Compliance                Visit Number 1 2  3  4 Re-Eval  IE -  -  Y            AYANA   Foto Captured Y -  -  Y                      5/25 6/1 6/14 6/29               Manual                      Knee PROM   arb  def  -               STM   -                   Patellar mobs  arb def -         R shoulder PROM  arb  def  -               Ther-Ex                      Bike- warm up   -  -                 Heel slides HEP  20x  -  HEP               SAQ   2x10  2x10 #1 2x10 #1               Calf stretch  HEP  -  -                 HS stretch  HEP  -  -                 bridge   -  -                 SLR- 2 ways    flex  15x  flex 10x #1  flex  10x #1               clamshells   green 2x10  green 2x10  green 2x10               HR/TR   -                   Mini squats   - 15x at bar HEP         LAQ  20x 20x #1 20x #1         HS curls   - -          Supine shoulder flexion cane HEP 20x  20x #1- added abduction 20x #1flex /abd  Wall slides   Flex   15x HEP         Cervical ROM 4 ways HEP 10x ea 15x ea -         Upper trap stretch  HEP 3x30" 3x30" HEP         pullys  5'  5'  5'         Finger ladder   10x 15x 15x          rows    Green TB 2x10         Low rows    Green TB 2x10         Shoulder squeeze  2x10 5"  -                 Neuro Re-Ed                      Balance- TS   -                   Balance- narros YONATHAN  -                                                  Ther-Act                                                               Modalities

## 2021-07-21 ENCOUNTER — HOSPITAL ENCOUNTER (OUTPATIENT)
Dept: ULTRASOUND IMAGING | Facility: HOSPITAL | Age: 66
Discharge: HOME | End: 2021-07-21
Payer: COMMERCIAL

## 2021-07-21 DIAGNOSIS — N18.31 CHRONIC KIDNEY DISEASE, STAGE 3A (HCC): ICD-10-CM

## 2021-07-21 PROCEDURE — 76770 US EXAM ABDO BACK WALL COMP: CPT

## 2022-02-25 ENCOUNTER — OFFICE VISIT (OUTPATIENT)
Dept: OBGYN CLINIC | Facility: CLINIC | Age: 67
End: 2022-02-25
Payer: COMMERCIAL

## 2022-02-25 VITALS
DIASTOLIC BLOOD PRESSURE: 86 MMHG | SYSTOLIC BLOOD PRESSURE: 140 MMHG | TEMPERATURE: 96.5 F | HEART RATE: 98 BPM | BODY MASS INDEX: 42.95 KG/M2 | HEIGHT: 69 IN | WEIGHT: 290 LBS

## 2022-02-25 DIAGNOSIS — M17.11 PRIMARY OSTEOARTHRITIS OF RIGHT KNEE: Primary | ICD-10-CM

## 2022-02-25 DIAGNOSIS — G89.29 CHRONIC PAIN OF RIGHT KNEE: ICD-10-CM

## 2022-02-25 DIAGNOSIS — M25.561 CHRONIC PAIN OF RIGHT KNEE: ICD-10-CM

## 2022-02-25 PROCEDURE — 20610 DRAIN/INJ JOINT/BURSA W/O US: CPT | Performed by: ORTHOPAEDIC SURGERY

## 2022-02-25 PROCEDURE — 99213 OFFICE O/P EST LOW 20 MIN: CPT | Performed by: ORTHOPAEDIC SURGERY

## 2022-02-25 RX ORDER — TRIAMCINOLONE ACETONIDE 40 MG/ML
40 INJECTION, SUSPENSION INTRA-ARTICULAR; INTRAMUSCULAR
Status: COMPLETED | OUTPATIENT
Start: 2022-02-25 | End: 2022-02-25

## 2022-02-25 RX ORDER — LIDOCAINE HYDROCHLORIDE 10 MG/ML
4 INJECTION, SOLUTION INFILTRATION; PERINEURAL
Status: COMPLETED | OUTPATIENT
Start: 2022-02-25 | End: 2022-02-25

## 2022-02-25 RX ORDER — LOSARTAN POTASSIUM AND HYDROCHLOROTHIAZIDE 12.5; 5 MG/1; MG/1
1 TABLET ORAL DAILY
COMMUNITY
Start: 2022-02-25

## 2022-02-25 RX ADMIN — TRIAMCINOLONE ACETONIDE 40 MG: 40 INJECTION, SUSPENSION INTRA-ARTICULAR; INTRAMUSCULAR at 15:08

## 2022-02-25 RX ADMIN — LIDOCAINE HYDROCHLORIDE 4 ML: 10 INJECTION, SOLUTION INFILTRATION; PERINEURAL at 15:08

## 2022-02-25 NOTE — PROGRESS NOTES
ASSESSMENT/PLAN:    Diagnoses and all orders for this visit:    Primary osteoarthritis of right knee    Chronic pain of right knee    Other orders  -     losartan-hydrochlorothiazide (HYZAAR) 50-12 5 mg per tablet; Take 1 tablet by mouth in the morning        Plan:  Treatment options were discussed  She did elect to proceed with corticosteroid injection which was performed without difficulty  I will see her back as needed  If symptoms do not adequately improve, do not see any reason why we could not pursue Visco supplement injection  Just because she had a previous injection she does not believe provided significant benefit, there would be no reason not to pursue a Visco supplement injection as it certainly may provide relief  Knee replacement arthroplasty could be considered but her BMI is greater than 40 and she does understand she would need to lose weight, perhaps 25-30 lb might be enough  Return if symptoms worsen or fail to improve       _____________________________________________________  CHIEF COMPLAINT:  Chief Complaint   Patient presents with    Follow-up         SUBJECTIVE:  Naya Juárez is a 79y o  year old female who presents for evaluation of her right knee  She was last seen approximately 9 months ago and is now 10 months status post corticosteroid injection  She noted improvement in knee symptoms, moderate improvement overall, for about 2-3 months  She did not return for follow-up on a more urgent basis choosing to live with her symptoms  However, the pain seems to be increasing and she now believes her pain is even worse than it was prior to the injection done in April of 2021  She complains of medial-sided pain with activity  Pain will radiate down to the pes bursa and medial lower leg  She denies paresthesias    She does experience pain with ambulation but can generally push through the pain and does not have to let sit or rest   She does note start-up pain and difficulties when arising from a seated position  PAST MEDICAL HISTORY:  Past Medical History:   Diagnosis Date    Anxiety     Arthritis     knees and feet    Depression     Hypertension     Sciatica     Stomach ulcer        PAST SURGICAL HISTORY:  Past Surgical History:   Procedure Laterality Date    APPENDECTOMY      CHOLECYSTECTOMY      GASTRIC BYPASS      HERNIA REPAIR         FAMILY HISTORY:  Family History   Problem Relation Age of Onset    Hypertension Mother     Heart attack Mother     Stroke Mother     Hypertension Father     Heart attack Father     No Known Problems Maternal Grandmother     No Known Problems Maternal Grandfather     No Known Problems Paternal Grandmother     No Known Problems Paternal Grandfather     Cancer Maternal Aunt         bladder cancer    No Known Problems Paternal Aunt     No Known Problems Paternal Aunt        SOCIAL HISTORY:  Social History     Tobacco Use    Smoking status: Never Smoker    Smokeless tobacco: Never Used   Substance Use Topics    Alcohol use:  Yes    Drug use: Never       MEDICATIONS:    Current Outpatient Medications:     citalopram (CeleXA) 20 mg tablet, Take 1 tablet by mouth 2 (two) times a day, Disp: , Rfl:     Cyanocobalamin (VITAMIN B-12 PO), Take by mouth, Disp: , Rfl:     IRON-VITAMIN C PO, Take by mouth, Disp: , Rfl:     losartan-hydrochlorothiazide (HYZAAR) 50-12 5 mg per tablet, Take 1 tablet by mouth in the morning, Disp: , Rfl:     Multiple Minerals-Vitamins (CALCIUM CITRATE PLUS PO), Take by mouth, Disp: , Rfl:     pantoprazole (PROTONIX) 40 mg tablet, Take 1 tablet by mouth daily, Disp: , Rfl:     VITAMIN D PO, Take by mouth, Disp: , Rfl:     Diclofenac Sodium (VOLTAREN) 1 %, Apply 2 g topically 4 (four) times a day (Patient not taking: Reported on 2/25/2022 ), Disp: 240 g, Rfl: 0    hydrochlorothiazide (HYDRODIURIL) 12 5 mg tablet, Take 1 tablet by mouth daily (Patient not taking: Reported on 2/25/2022 ), Disp: , Rfl:    losartan (COZAAR) 50 mg tablet, Take 1 tablet by mouth daily (Patient not taking: Reported on 2/25/2022 ), Disp: , Rfl:     ALLERGIES:  No Known Allergies    Review of systems:   Constitutional: Negative for fatigue, fever or loss of apetite  HENT: Negative  Respiratory: Negative for shortness of breath, dyspnea  Cardiovascular: Negative for chest pain/tightness  Gastrointestinal: Negative for abdominal pain, N/V  Endocrine: Negative for cold/heat intolerance, unexplained weight loss/gain  Genitourinary: Negative for flank pain, dysuria, hematuria  Musculoskeletal:  Positive as in the HPI   Skin: Negative for rash  Neurological:  Negative  Psychiatric/Behavioral: Negative for agitation  _____________________________________________________  PHYSICAL EXAMINATION:    Blood pressure 140/86, pulse 98, temperature (!) 96 5 °F (35 8 °C), temperature source Temporal, height 5' 9" (1 753 m), weight 132 kg (290 lb)  General: well developed and well nourished, alert, oriented times 3 and appears comfortable  Psychiatric: Normal  HEENT: Benign  Cardiovascular: Regular    Pulmonary: No wheezing or stridor  Abdomen: Soft, Nontender  Skin: No masses, erythema, lacerations, fluctation, ulcerations  Neurovascular: Motor and sensory exams are grossly intact and pulses palpable  MUSCULOSKELETAL EXAMINATION:    The right knee exam demonstrates skin to be warm and dry  There is no visible deformity  Hypertrophy is noted  She does have some soft tissue swelling  Ligaments are grossly stable although slight laxity with valgus stress is noted would would be consistent with a mild varus deformity of the knee  She has no complaints during active and passive motion  She does have tenderness along both the medial and lateral joint line as well as over the medial femoral condyle  There is no tenderness to palpation of the pes bursa          PROCEDURES:  Large joint arthrocentesis: R knee  Universal Protocol:  Consent: Verbal consent obtained    Consent given by: patient  Patient understanding: patient states understanding of the procedure being performed    Procedure Details  Location: knee - R knee  Needle size: 22 G  Approach: lateral  Medications administered: 4 mL lidocaine 1 %; 40 mg triamcinolone acetonide 40 mg/mL              Nazia Anger

## 2022-02-27 ENCOUNTER — OFFICE VISIT (OUTPATIENT)
Dept: URGENT CARE | Facility: CLINIC | Age: 67
End: 2022-02-27
Payer: COMMERCIAL

## 2022-02-27 VITALS
SYSTOLIC BLOOD PRESSURE: 162 MMHG | HEIGHT: 69 IN | RESPIRATION RATE: 16 BRPM | DIASTOLIC BLOOD PRESSURE: 82 MMHG | WEIGHT: 290 LBS | BODY MASS INDEX: 42.95 KG/M2 | TEMPERATURE: 98 F | HEART RATE: 60 BPM | OXYGEN SATURATION: 99 %

## 2022-02-27 DIAGNOSIS — R30.9 PAINFUL URINATION: Primary | ICD-10-CM

## 2022-02-27 LAB
SL AMB  POCT GLUCOSE, UA: NEGATIVE
SL AMB LEUKOCYTE ESTERASE,UA: ABNORMAL
SL AMB POCT BILIRUBIN,UA: NEGATIVE
SL AMB POCT BLOOD,UA: ABNORMAL
SL AMB POCT CLARITY,UA: CLEAR
SL AMB POCT COLOR,UA: YELLOW
SL AMB POCT KETONES,UA: ABNORMAL
SL AMB POCT NITRITE,UA: NEGATIVE
SL AMB POCT PH,UA: 7
SL AMB POCT SPECIFIC GRAVITY,UA: 1.02
SL AMB POCT URINE PROTEIN: NEGATIVE
SL AMB POCT UROBILINOGEN: NEGATIVE

## 2022-02-27 PROCEDURE — 99213 OFFICE O/P EST LOW 20 MIN: CPT

## 2022-02-27 PROCEDURE — 87086 URINE CULTURE/COLONY COUNT: CPT

## 2022-02-27 PROCEDURE — 81002 URINALYSIS NONAUTO W/O SCOPE: CPT

## 2022-02-27 PROCEDURE — 87077 CULTURE AEROBIC IDENTIFY: CPT

## 2022-02-27 PROCEDURE — 87186 SC STD MICRODIL/AGAR DIL: CPT

## 2022-02-27 PROCEDURE — S9083 URGENT CARE CENTER GLOBAL: HCPCS

## 2022-02-27 RX ORDER — SULFAMETHOXAZOLE AND TRIMETHOPRIM 800; 160 MG/1; MG/1
1 TABLET ORAL EVERY 12 HOURS SCHEDULED
Qty: 6 TABLET | Refills: 0 | Status: SHIPPED | OUTPATIENT
Start: 2022-02-27 | End: 2022-03-02

## 2022-02-27 RX ORDER — PHENAZOPYRIDINE HYDROCHLORIDE 200 MG/1
200 TABLET, FILM COATED ORAL
Qty: 6 TABLET | Refills: 0 | Status: SHIPPED | OUTPATIENT
Start: 2022-02-27 | End: 2022-03-01

## 2022-02-27 NOTE — PROGRESS NOTES
[facility]        NAME: Ellen Ortega is a 79 y o  female  : 1955    MRN: [id number]  DATE: 2022  TIME: 5:18 PM    Assessment and Plan   Painful urination [R30 9]  1  Painful urination  POCT urine dip    Urine culture    phenazopyridine (PYRIDIUM) 200 mg tablet    sulfamethoxazole-trimethoprim (BACTRIM DS) 800-160 mg per tablet         Patient Instructions     UXC sent  Antibiotic as directed  Follow up with PCP in 3-5 days  Proceed to ER if symptoms worsen  Chief Complaint     Chief Complaint   Patient presents with    Possible UTI     pain and burning upon urination x 2 days         History of Present Illness     79 y o  F presents with complaints of dysuria, burning, frequency and urgency x 2 days  Denies hematuria  Denies back pain, fevers, or chills  States she has a history of a kidney stone 20 years ago  Not taking anything OTC  UA with mod leuks, mod ketones, large blood  Pt states she had a small cyst outside her labia that burst and was bleeding and this could have caused the abnormal blood sample  Review of Systems   Review of Systems   Constitutional: Negative for chills, fatigue and fever  HENT: Negative for congestion, ear discharge, ear pain, facial swelling, rhinorrhea, sinus pain, sore throat and trouble swallowing  Eyes: Negative for photophobia and visual disturbance  Respiratory: Negative for cough, chest tightness, shortness of breath and wheezing  Cardiovascular: Negative for chest pain, palpitations and leg swelling  Gastrointestinal: Negative for abdominal pain, diarrhea, nausea and vomiting  Genitourinary: Positive for dysuria, frequency and urgency  Negative for decreased urine volume, difficulty urinating, flank pain, hematuria and pelvic pain  Musculoskeletal: Negative for arthralgias, back pain and myalgias  Skin: Negative for pallor  Neurological: Negative for dizziness, weakness, numbness and headaches  Psychiatric/Behavioral: Negative for sleep disturbance           Current Medications       Current Outpatient Medications:     citalopram (CeleXA) 20 mg tablet, Take 1 tablet by mouth 2 (two) times a day, Disp: , Rfl:     Cyanocobalamin (VITAMIN B-12 PO), Take by mouth, Disp: , Rfl:     IRON-VITAMIN C PO, Take by mouth, Disp: , Rfl:     losartan-hydrochlorothiazide (HYZAAR) 50-12 5 mg per tablet, Take 1 tablet by mouth in the morning, Disp: , Rfl:     Multiple Minerals-Vitamins (CALCIUM CITRATE PLUS PO), Take by mouth, Disp: , Rfl:     pantoprazole (PROTONIX) 40 mg tablet, Take 1 tablet by mouth daily, Disp: , Rfl:     VITAMIN D PO, Take by mouth, Disp: , Rfl:     Diclofenac Sodium (VOLTAREN) 1 %, Apply 2 g topically 4 (four) times a day (Patient not taking: Reported on 2/25/2022 ), Disp: 240 g, Rfl: 0    hydrochlorothiazide (HYDRODIURIL) 12 5 mg tablet, Take 1 tablet by mouth daily (Patient not taking: Reported on 2/25/2022 ), Disp: , Rfl:     losartan (COZAAR) 50 mg tablet, Take 1 tablet by mouth daily (Patient not taking: Reported on 2/25/2022 ), Disp: , Rfl:     phenazopyridine (PYRIDIUM) 200 mg tablet, Take 1 tablet (200 mg total) by mouth 3 (three) times a day with meals for 2 days, Disp: 6 tablet, Rfl: 0    sulfamethoxazole-trimethoprim (BACTRIM DS) 800-160 mg per tablet, Take 1 tablet by mouth every 12 (twelve) hours for 3 days, Disp: 6 tablet, Rfl: 0    Current Allergies     Allergies as of 02/27/2022    (No Known Allergies)            The following portions of the patient's history were reviewed and updated as appropriate: allergies, current medications, past family history, past medical history, past social history, past surgical history and problem list      Past Medical History:   Diagnosis Date    Anxiety     Arthritis     knees and feet    Depression     GERD (gastroesophageal reflux disease)     Hypertension     Sciatica     Stomach ulcer        Past Surgical History:   Procedure Laterality Date    APPENDECTOMY      CHOLECYSTECTOMY      GASTRIC BYPASS      HERNIA REPAIR         Family History   Problem Relation Age of Onset    Hypertension Mother     Heart attack Mother     Stroke Mother     Hypertension Father     Heart attack Father     No Known Problems Maternal Grandmother     No Known Problems Maternal Grandfather     No Known Problems Paternal Grandmother     No Known Problems Paternal Grandfather     Cancer Maternal Aunt         bladder cancer    No Known Problems Paternal Aunt     No Known Problems Paternal Aunt          Medications have been verified  Objective   /82   Pulse 60   Temp 98 °F (36 7 °C)   Resp 16   Ht 5' 9" (1 753 m)   Wt 132 kg (290 lb)   SpO2 99%   BMI 42 83 kg/m²   No LMP recorded  Patient is postmenopausal        Physical Exam     Physical Exam  Vitals reviewed  Constitutional:       General: She is not in acute distress  Appearance: Normal appearance  She is not toxic-appearing  HENT:      Head: Normocephalic  Right Ear: Tympanic membrane normal       Left Ear: Tympanic membrane normal       Nose: No congestion or rhinorrhea  Right Sinus: No maxillary sinus tenderness or frontal sinus tenderness  Left Sinus: No maxillary sinus tenderness or frontal sinus tenderness  Mouth/Throat:      Pharynx: No oropharyngeal exudate or posterior oropharyngeal erythema  Tonsils: No tonsillar exudate  Eyes:      Pupils: Pupils are equal, round, and reactive to light  Cardiovascular:      Rate and Rhythm: Normal rate and regular rhythm  Pulses: Normal pulses  Heart sounds: Normal heart sounds  Pulmonary:      Effort: Pulmonary effort is normal       Breath sounds: Normal breath sounds  No wheezing  Abdominal:      General: Bowel sounds are normal       Palpations: Abdomen is soft  Tenderness: There is no abdominal tenderness  There is no right CVA tenderness or left CVA tenderness  Musculoskeletal:         General: Normal range of motion  Cervical back: Normal range of motion  Lymphadenopathy:      Cervical: No cervical adenopathy  Skin:     General: Skin is warm and dry  Neurological:      General: No focal deficit present  Mental Status: She is alert

## 2022-02-27 NOTE — PATIENT INSTRUCTIONS
Urinary Tract Infection in Women   Antibiotic as directed  Follow up with PCP in 3-5 days  Proceed to ER if symptoms worsen ie blood in urine, fever, chills, back pain  AMBULATORY CARE:   A urinary tract infection (UTI)  is caused by bacteria that get inside your urinary tract  Most bacteria that enter your urinary tract come out when you urinate  If the bacteria stay in your urinary tract, you may get an infection  Your urinary tract includes your kidneys, ureters, bladder, and urethra  Urine is made in your kidneys, and it flows from the ureters to the bladder  Urine leaves the bladder through the urethra  A UTI is more common in your lower urinary tract, which includes your bladder and urethra  Common symptoms include the following:   · Urinating more often or waking from sleep to urinate    · Pain or burning when you urinate    · Pain or pressure in your lower abdomen     · Urine that smells bad    · Blood in your urine    · Leaking urine    Seek care immediately if:   · You are urinating very little or not at all  · You have a high fever with shaking chills  · You have side or back pain that gets worse  Call your doctor if:   · You have a fever  · You do not feel better after 2 days of taking antibiotics  · You are vomiting  · You have questions or concerns about your condition or care  Treatment for a UTI  may include antibiotics to treat a bacterial infection  You may also need medicines to decrease pain and burning, or decrease the urge to urinate often  If you have UTIs often (called recurrent UTIs), you may be given antibiotics to take regularly  You will be given directions for when and how to use antibiotics  The goal is to prevent UTIs but not cause antibiotic resistance by using antibiotics too often  Prevent a UTI:   · Empty your bladder often  Urinate and empty your bladder as soon as you feel the need  Do not hold your urine for long periods of time      · Wipe from front to back after you urinate or have a bowel movement  This will help prevent germs from getting into your urinary tract through your urethra  · Drink liquids as directed  Ask how much liquid to drink each day and which liquids are best for you  You may need to drink more liquids than usual to help flush out the bacteria  Do not drink alcohol, caffeine, or citrus juices  These can irritate your bladder and increase your symptoms  Your healthcare provider may recommend cranberry juice to help prevent a UTI  · Urinate after you have sex  This can help flush out bacteria passed during sex  · Do not douche or use feminine deodorants  These can change the chemical balance in your vagina  · Change sanitary pads or tampons often  This will help prevent germs from getting into your urinary tract  · Talk to your healthcare provider about your birth control method  You may need to change your method if it is increasing your risk for UTIs  · Wear cotton underwear and clothes that are loose  Tight pants and nylon underwear can trap moisture and cause bacteria to grow  · Vaginal estrogen may be recommended  This medicine helps prevent UTIs in women who have gone through menopause or are in estelita-menopause  · Do pelvic muscle exercises often  Pelvic muscle exercises may help you start and stop urinating  Strong pelvic muscles may help you empty your bladder easier  Squeeze these muscles tightly for 5 seconds like you are trying to hold back urine  Then relax for 5 seconds  Gradually work up to squeezing for 10 seconds  Do 3 sets of 15 repetitions a day, or as directed  Follow up with your doctor as directed:  Write down your questions so you remember to ask them during your visits  © Copyright [employer] 2022 Information is for End User's use only and may not be sold, redistributed or otherwise used for commercial purposes   All illustrations and images included in CareNotes® are the copyrighted property of A D A M , Inc  or [facility] Hemalatha Phelps   The above information is an  only  It is not intended as medical advice for individual conditions or treatments  Talk to your doctor, nurse or pharmacist before following any medical regimen to see if it is safe and effective for you

## 2022-03-02 LAB
BACTERIA UR CULT: ABNORMAL
BACTERIA UR CULT: ABNORMAL

## 2022-06-24 ENCOUNTER — TELEPHONE (OUTPATIENT)
Dept: OTHER | Facility: OTHER | Age: 67
End: 2022-06-24

## 2022-09-06 ENCOUNTER — OFFICE VISIT (OUTPATIENT)
Dept: URGENT CARE | Facility: CLINIC | Age: 67
End: 2022-09-06
Payer: COMMERCIAL

## 2022-09-06 ENCOUNTER — APPOINTMENT (OUTPATIENT)
Dept: RADIOLOGY | Facility: CLINIC | Age: 67
End: 2022-09-06
Payer: COMMERCIAL

## 2022-09-06 VITALS
HEIGHT: 69 IN | TEMPERATURE: 98.6 F | SYSTOLIC BLOOD PRESSURE: 138 MMHG | WEIGHT: 293 LBS | HEART RATE: 72 BPM | OXYGEN SATURATION: 95 % | RESPIRATION RATE: 16 BRPM | BODY MASS INDEX: 43.4 KG/M2 | DIASTOLIC BLOOD PRESSURE: 70 MMHG

## 2022-09-06 DIAGNOSIS — S99.922A FOOT INJURY, LEFT, INITIAL ENCOUNTER: ICD-10-CM

## 2022-09-06 DIAGNOSIS — M77.52 LEFT ANKLE TENDONITIS: Primary | ICD-10-CM

## 2022-09-06 PROCEDURE — 99213 OFFICE O/P EST LOW 20 MIN: CPT | Performed by: PHYSICIAN ASSISTANT

## 2022-09-06 PROCEDURE — S9083 URGENT CARE CENTER GLOBAL: HCPCS | Performed by: PHYSICIAN ASSISTANT

## 2022-09-06 PROCEDURE — 73630 X-RAY EXAM OF FOOT: CPT

## 2022-09-06 RX ORDER — TORSEMIDE 5 MG/1
TABLET ORAL
COMMUNITY
Start: 2022-08-26

## 2022-09-06 NOTE — PROGRESS NOTES
[address] [facility]        NAME: Guido Celestin is a 79 y o  female  : 1955    MRN: [id number]  DATE: 2022  TIME: 7:06 PM    Assessment and Plan   Left ankle tendonitis [M77 52]  1  Left ankle tendonitis  XR foot 3+ vw left    Diclofenac Sodium (VOLTAREN) 1 %    Ambulatory referral to Physical Therapy    Orthopedic injury treatment         Patient Instructions   Rest, ice, compression, elevation  Cam boot  Medication as prescribed  Physical therapy  Follow-up with family doctor in 3-5 days  Go to ER symptoms become severe  Please be aware that x-rays only show bony abnormalities  It does not adequately show soft tissue, tendons, ligaments, or muscles abdnormalities  If symptoms continue, please follow-up with your family doctor or orthopedics for additional evaluation and imaging modalities  Follow up with PCP in 3-5 days  Proceed to  ER if symptoms worsen  Chief Complaint     Chief Complaint   Patient presents with    Foot Injury     PT states she was walking to her car and she heard what she describes as a "snap" in her left foot  She states she had some swelling in that foot for approximately 10 days  Her PCP is aware of the swelling and she has been trying to get an appt with her PCP  History of Present Illness       Patient is a 26-year-old female with no significant past medical history presents the office complaining of left ankle pain  States she was walking and felt and heard a pop in her ankle with pain  She has associated swelling  Pain is rated 3/10  Reports difficulty with ambulation  Denies any prior significant injuries to the foot  Review of Systems   Review of Systems   Musculoskeletal: Positive for arthralgias and joint swelling  Neurological: Negative for numbness           Current Medications       Current Outpatient Medications:     citalopram (CeleXA) 20 mg tablet, Take 1 tablet by mouth 2 (two) times a day, Disp: , Rfl:    Cyanocobalamin (VITAMIN B-12 PO), Take by mouth, Disp: , Rfl:     Diclofenac Sodium (VOLTAREN) 1 %, Apply 2 g topically 4 (four) times a day, Disp: 100 g, Rfl: 0    IRON-VITAMIN C PO, Take by mouth, Disp: , Rfl:     losartan-hydrochlorothiazide (HYZAAR) 50-12 5 mg per tablet, Take 1 tablet by mouth in the morning, Disp: , Rfl:     Multiple Minerals-Vitamins (CALCIUM CITRATE PLUS PO), Take by mouth, Disp: , Rfl:     pantoprazole (PROTONIX) 40 mg tablet, Take 1 tablet by mouth daily, Disp: , Rfl:     torsemide (DEMADEX) 5 MG tablet, TAKE 1/2 TABLET BY MOUTH DAILY X 1 WEEK THEN AS NEEDED FOR EDEMA, Disp: , Rfl:     VITAMIN D PO, Take by mouth, Disp: , Rfl:     hydrochlorothiazide (HYDRODIURIL) 12 5 mg tablet, Take 1 tablet by mouth daily (Patient not taking: No sig reported), Disp: , Rfl:     losartan (COZAAR) 50 mg tablet, Take 1 tablet by mouth daily (Patient not taking: No sig reported), Disp: , Rfl:     Current Allergies     Allergies as of 09/06/2022 - Reviewed 09/06/2022   Allergen Reaction Noted    Medical tape Rash 12/11/2006    Wound dressing adhesive Rash 09/06/2022            The following portions of the patient's history were reviewed and updated as appropriate: allergies, current medications, past family history, past medical history, past social history, past surgical history and problem list      Past Medical History:   Diagnosis Date    Anxiety     Arthritis     knees and feet    Depression     GERD (gastroesophageal reflux disease)     Hypertension     Sciatica     Stomach ulcer        Past Surgical History:   Procedure Laterality Date    APPENDECTOMY      CHOLECYSTECTOMY      GASTRIC BYPASS      HERNIA REPAIR         Family History   Problem Relation Age of Onset    Hypertension Mother     Heart attack Mother     Stroke Mother     Hypertension Father     Heart attack Father     No Known Problems Maternal Grandmother     No Known Problems Maternal Grandfather     No Known Problems Paternal Grandmother     No Known Problems Paternal Grandfather     Cancer Maternal Aunt         bladder cancer    No Known Problems Paternal Aunt     No Known Problems Paternal Aunt          Medications have been verified  Objective   /70   Pulse 72   Temp 98 6 °F (37 °C)   Resp 16   Ht 5' 9" (1 753 m)   Wt 136 kg (300 lb)   SpO2 95%   BMI 44 30 kg/m²   No LMP recorded  Patient is postmenopausal        Physical Exam     Physical Exam  Vitals and nursing note reviewed  Constitutional:       Appearance: She is well-developed  HENT:      Head: Normocephalic and atraumatic  Right Ear: External ear normal       Left Ear: External ear normal       Nose: Nose normal    Eyes:      General: Lids are normal       Conjunctiva/sclera: Conjunctivae normal    Musculoskeletal:      Left ankle: Swelling (Lateral aspect) present  No ecchymosis  Tenderness present over the CF ligament and posterior TF ligament  No lateral malleolus, medial malleolus, ATF ligament, AITF ligament, base of 5th metatarsal or proximal fibula tenderness  Normal range of motion  Anterior drawer test negative  Normal pulse  Left Achilles Tendon: Normal    Skin:     General: Skin is warm and dry  Capillary Refill: Capillary refill takes less than 2 seconds  Findings: No rash  Neurological:      Mental Status: She is alert  Left foot x-ray:  No evidence of acute osseous abnormalities  Radiology interpretation pending  Orthopedic injury treatment    Date/Time: 9/6/2022 6:58 PM  Performed by: Cameron Dennis PA-C  Authorized by: Cameron Dennis PA-C     Patient Location:  Bedside  [place] Protocol:  Procedure performed by: (LARRY Muniz)  Consent: Verbal consent obtained    Risks and benefits: risks, benefits and alternatives were discussed  Consent given by: patient  Patient understanding: patient states understanding of the procedure being performed  Patient consent: the patient's understanding of the procedure matches consent given  Patient identity confirmed: verbally with patient      Injury location:  Ankle  Location details:  Left ankle  Injury type: Soft tissue  Neurovascular status: Neurovascularly intact    Distal perfusion: normal    Neurological function: normal    Range of motion: normal    Immobilization: Cam boot    Neurovascular status: Neurovascularly intact    Distal perfusion: normal    Neurological function: normal    Range of motion: unchanged    Patient tolerance:  Patient tolerated the procedure well with no immediate complications

## 2022-09-06 NOTE — PATIENT INSTRUCTIONS
Rest, ice, compression, elevation  Cam boot  Medication as prescribed  Physical therapy  Follow-up with family doctor in 3-5 days  Go to ER symptoms become severe  Please be aware that x-rays only show bony abnormalities  It does not adequately show soft tissue, tendons, ligaments, or muscles abdnormalities  If symptoms continue, please follow-up with your family doctor or orthopedics for additional evaluation and imaging modalities

## 2022-09-06 NOTE — LETTER
September 6, 2022     Patient: Rico Kauffman   YOB: 1955   Date of Visit: 9/6/2022       To Whom It May Concern: It is my medical opinion that Rico Kauffman may return to work on 09-  If you have any questions or concerns, please don't hesitate to call           Sincerely,        Ashanti Calderon PA-C    CC: No Recipients

## 2022-10-28 ENCOUNTER — HOSPITAL ENCOUNTER (OUTPATIENT)
Dept: RADIOLOGY | Facility: CLINIC | Age: 67
End: 2022-10-28
Payer: COMMERCIAL

## 2022-10-28 ENCOUNTER — OFFICE VISIT (OUTPATIENT)
Dept: OBGYN CLINIC | Facility: CLINIC | Age: 67
End: 2022-10-28

## 2022-10-28 VITALS
SYSTOLIC BLOOD PRESSURE: 132 MMHG | OXYGEN SATURATION: 92 % | DIASTOLIC BLOOD PRESSURE: 100 MMHG | HEART RATE: 84 BPM | BODY MASS INDEX: 34.97 KG/M2 | TEMPERATURE: 97.3 F | WEIGHT: 236.8 LBS

## 2022-10-28 DIAGNOSIS — M25.561 CHRONIC PAIN OF RIGHT KNEE: ICD-10-CM

## 2022-10-28 DIAGNOSIS — G89.29 CHRONIC PAIN OF RIGHT KNEE: Primary | ICD-10-CM

## 2022-10-28 DIAGNOSIS — M25.561 CHRONIC PAIN OF RIGHT KNEE: Primary | ICD-10-CM

## 2022-10-28 DIAGNOSIS — M54.50 CHRONIC RIGHT-SIDED LOW BACK PAIN WITHOUT SCIATICA: ICD-10-CM

## 2022-10-28 DIAGNOSIS — M47.896 OTHER SPONDYLOSIS, LUMBAR REGION: ICD-10-CM

## 2022-10-28 DIAGNOSIS — G89.29 CHRONIC PAIN OF RIGHT KNEE: ICD-10-CM

## 2022-10-28 DIAGNOSIS — M17.11 PRIMARY OSTEOARTHRITIS OF RIGHT KNEE: ICD-10-CM

## 2022-10-28 DIAGNOSIS — G89.29 CHRONIC RIGHT-SIDED LOW BACK PAIN WITHOUT SCIATICA: ICD-10-CM

## 2022-10-28 PROCEDURE — 72100 X-RAY EXAM L-S SPINE 2/3 VWS: CPT

## 2022-10-28 PROCEDURE — 73562 X-RAY EXAM OF KNEE 3: CPT

## 2022-10-28 NOTE — PROGRESS NOTES
ASSESSMENT/PLAN:    Problem List Items Addressed This Visit        Musculoskeletal and Integument    Primary osteoarthritis of right knee    Relevant Orders    XR knee 3 vw right non injury    Other spondylosis, lumbar region       Other    Chronic pain of right knee - Primary    Relevant Orders    XR knee 3 vw right non injury    Chronic right-sided low back pain without sciatica    Relevant Orders    XR spine lumbar 2 or 3 views injury    Ambulatory referral to Pain Management          X-rays taken today were reviewed with the patient, which revealed severe tricompartmental degenerative changes of the right knee, as well as significant multilevel degenerative changes of the lumbar spine with facet sclerosis and reversal of lumbar lordosis  Discussion of ongoing management of her knee discussed with the patient in detail  The patient expressed her desire to proceed with a total knee replacement now that her BMI is within acceptable limits  The patient noted that she would like to wait, however, until after the holidays to schedule the surgery  She will return to the office as needed once she is ready to further discuss and schedule surgery for her right knee  In regards to her low back pain, the patient was provided with a referral for Dr Walter Loja [facility] for further evaluation and management  Return for  will call       _____________________________________________________  CHIEF COMPLAINT:  No chief complaint on file  SUBJECTIVE:  Ga Tabor is a 79 y o  female who presents for follow up of her right knee  The patient was last seen in regards to her knee on 2/25/2022, where she received a CSI  The patient states that the CSI had only provided her with a few weeks of relief  She notes ongoing, unchanged pain along the anteromedial aspect of the knee  The patient denies any new injuries or traumas, and denies any changes in activity   The patient states that she has started to use a cane in the past few months due to the pain  The pain worsens with ambulation and activity  She notes that she is not able to go shopping anymore without using a motorized shopping cart  She denies any numbness or tingling in the lower extremity  She admits to clicking and popping in the knee, but denies any locking or catching  The patient states that she has now tried and failed conservative management with cortisone injections, gel injections, bracing, and home exercises  She states that she has been working on healthy weight loss  Today the patient also complains of right-sided low back pain  She admits to a history of chronic low back pain, though states that usually the episodes of back pain resolve in a few days  Her most recent episode has persisted now for 2 weeks  When asked where the pain is located, she points along the right buttock  The patient denies any radiating pain, numbness, or tingling  She states that the pain worsens with getting up from a seated position or lying down  The patient has not seen any other provider for her symptoms  Patient does admit to a past history of right-sided sciatica, though states that she has not had a flare in many years and that this pain feels different          PAST MEDICAL HISTORY:  Past Medical History:   Diagnosis Date   • Anxiety    • Arthritis     knees and feet   • Depression    • GERD (gastroesophageal reflux disease)    • Hypertension    • Sciatica    • Stomach ulcer        PAST SURGICAL HISTORY:  Past Surgical History:   Procedure Laterality Date   • APPENDECTOMY     • CHOLECYSTECTOMY     • GASTRIC BYPASS     • HERNIA REPAIR         FAMILY HISTORY:  Family History   Problem Relation Age of Onset   • Hypertension Mother    • Heart attack Mother    • Stroke Mother    • Hypertension Father    • Heart attack Father    • No Known Problems Maternal Grandmother    • No Known Problems Maternal Grandfather    • No Known Problems Paternal Grandmother    • No Known Problems Paternal Grandfather    • Cancer Maternal Aunt         bladder cancer   • No Known Problems Paternal Aunt    • No Known Problems Paternal Aunt        SOCIAL HISTORY:  Social History     Tobacco Use   • Smoking status: Never Smoker   • Smokeless tobacco: Never Used   Vaping Use   • Vaping Use: Never used   Substance Use Topics   • Alcohol use: Yes   • Drug use: Never       MEDICATIONS:    Current Outpatient Medications:   •  citalopram (CeleXA) 20 mg tablet, Take 1 tablet by mouth 2 (two) times a day, Disp: , Rfl:   •  Cyanocobalamin (VITAMIN B-12 PO), Take by mouth, Disp: , Rfl:   •  Diclofenac Sodium (VOLTAREN) 1 %, Apply 2 g topically 4 (four) times a day, Disp: 100 g, Rfl: 0  •  IRON-VITAMIN C PO, Take by mouth, Disp: , Rfl:   •  losartan-hydrochlorothiazide (HYZAAR) 50-12 5 mg per tablet, Take 1 tablet by mouth in the morning, Disp: , Rfl:   •  Multiple Minerals-Vitamins (CALCIUM CITRATE PLUS PO), Take by mouth, Disp: , Rfl:   •  pantoprazole (PROTONIX) 40 mg tablet, Take 1 tablet by mouth daily, Disp: , Rfl:   •  VITAMIN D PO, Take by mouth, Disp: , Rfl:   •  hydrochlorothiazide (HYDRODIURIL) 12 5 mg tablet, Take 1 tablet by mouth daily (Patient not taking: No sig reported), Disp: , Rfl:   •  losartan (COZAAR) 50 mg tablet, Take 1 tablet by mouth daily (Patient not taking: No sig reported), Disp: , Rfl:   •  torsemide (DEMADEX) 5 MG tablet, TAKE 1/2 TABLET BY MOUTH DAILY X 1 WEEK THEN AS NEEDED FOR EDEMA (Patient not taking: Reported on 10/28/2022), Disp: , Rfl:     ALLERGIES:  Allergies   Allergen Reactions   • Medical Tape Rash     Silicones, Other reaction(s): Erythema     • Wound Dressing Adhesive Rash     Tape adhesive causes a reaction       REVIEW OF SYSTEMS:  Pertinent items are noted in HPI    A comprehensive review of systems was negative       _____________________________________________________  PHYSICAL EXAMINATION:  General: well developed and well nourished, alert, oriented times 3 and appears comfortable  Psychiatric: Normal  HEENT:  Normocephalic, atraumatic  Cardiovascular:  Regular  Pulmonary: No wheezing or stridor  Skin: No masses, erthema, lacerations, fluctation, ulcerations  Neurovascular: strong distal pulses, sensory and motor testing intact     MUSCULOSKELETAL EXAMINATION:    Right knee:  - patient presents with cane for ambulatory assistance  She is able to ambulate well on her own with a mild antalgic gait  - there is a mild palpable effusion present, no visible lesions, erythema, ecchymosis, warmth, or other signs of infection  - patient does complain of palpable tenderness along the medial joint line, she also notes tenderness radiating down the medial tibial shaft  The patient denies any tenderness along the patella, lateral joint line, femoral condyles, tibial plateau, or posterior aspect of the knee  - mild varus deformity correctable to neutral  - active knee range of motion 0-120 degrees without complaint  - positive patellofemoral crepitation with active and passive range of motion  - knee stable to varus and valgus stress  - negative anterior/posterior drawer, negative Lachman's  - negative Angel's  - 5/5 strength with resisted knee flexion/extension  - soft lower extremity compartments  - sensation intact to light touch distally  - lower extremity is well perfused    Lumbar spine:  - skin without lesions, effusion, ecchymosis, erythema, warmth, swelling or other signs of infection  No visible or palpable deformities  - patient complains of pain to palpation directly over the SI joint  No tenderness along the buttock, sciatic notch  She also notes tenderness palpation along the right paraspinal muscles, and mild tenderness palpation of the lumbar spine    - patient is able to perform a straight leg raise, though notes pain along the right-sided low back  - positive Jean Pierre's test with pain elicited in the lower back  - intact active and passive range of motion of the hip without complaint      _____________________________________________________  STUDIES REVIEWED:  X-rays of the right knee taken today in office demonstrate severe tricompartmental osteoarthritis with bone-on-bone changes noted along the medial compartment, osteophyte formation and subchondral sclerosis as well as nearly complete loss of the lateral joint space and patellofemoral joint spaces with associated subchondral sclerosis and osteophytes  X-rays of the lumbar spine taken in office demonstrate reversal of lumbar lordosis, significant multilevel degenerative changes, facet sclerosis and osteophyte formation              Karrie Barker

## 2022-10-31 ENCOUNTER — TELEPHONE (OUTPATIENT)
Dept: OBGYN CLINIC | Facility: HOSPITAL | Age: 67
End: 2022-10-31

## 2022-10-31 NOTE — TELEPHONE ENCOUNTER
Caller: patient  Doctor: Christopher Rosa    Reason for call: patient's weight recorded incorrectly in chart, would like to spk to the clinical team regarding the offer of surgery      Call back#: [phone]

## 2022-11-04 ENCOUNTER — CONSULT (OUTPATIENT)
Dept: PAIN MEDICINE | Facility: CLINIC | Age: 67
End: 2022-11-04

## 2022-11-04 VITALS
BODY MASS INDEX: 43.4 KG/M2 | TEMPERATURE: 98.4 F | OXYGEN SATURATION: 96 % | DIASTOLIC BLOOD PRESSURE: 80 MMHG | WEIGHT: 293 LBS | SYSTOLIC BLOOD PRESSURE: 138 MMHG | HEIGHT: 69 IN | HEART RATE: 89 BPM

## 2022-11-04 DIAGNOSIS — M54.50 CHRONIC RIGHT-SIDED LOW BACK PAIN WITHOUT SCIATICA: ICD-10-CM

## 2022-11-04 DIAGNOSIS — F32.0 CURRENT MILD EPISODE OF MAJOR DEPRESSIVE DISORDER WITHOUT PRIOR EPISODE (HCC): ICD-10-CM

## 2022-11-04 DIAGNOSIS — N18.31 CHRONIC KIDNEY DISEASE, STAGE 3A (HCC): ICD-10-CM

## 2022-11-04 DIAGNOSIS — M53.3 SACROILIAC JOINT DYSFUNCTION OF RIGHT SIDE: ICD-10-CM

## 2022-11-04 DIAGNOSIS — G89.29 CHRONIC RIGHT-SIDED LOW BACK PAIN WITHOUT SCIATICA: ICD-10-CM

## 2022-11-04 DIAGNOSIS — M47.816 LUMBAR SPONDYLOSIS: Primary | ICD-10-CM

## 2022-11-04 RX ORDER — METHYLPREDNISOLONE ACETATE 80 MG/ML
80 INJECTION, SUSPENSION INTRA-ARTICULAR; INTRALESIONAL; INTRAMUSCULAR; PARENTERAL; SOFT TISSUE ONCE
OUTPATIENT
Start: 2022-11-04 | End: 2022-11-04

## 2022-11-04 RX ORDER — LIDOCAINE HYDROCHLORIDE 10 MG/ML
5 INJECTION, SOLUTION EPIDURAL; INFILTRATION; INTRACAUDAL; PERINEURAL ONCE
OUTPATIENT
Start: 2022-11-04 | End: 2022-11-04

## 2022-11-04 RX ORDER — BUPIVACAINE HCL/PF 2.5 MG/ML
4 VIAL (ML) INJECTION ONCE
OUTPATIENT
Start: 2022-11-04 | End: 2022-11-04

## 2022-11-04 NOTE — PATIENT INSTRUCTIONS
Core Strengthening Exercises   WHAT YOU NEED TO KNOW:   Your core includes the muscles of your lower back, hip, pelvis, and abdomen  Core strengthening exercises help heal and strengthen these muscles  This helps prevent another injury, and keeps your pelvis, spine, and hips in the correct position  DISCHARGE INSTRUCTIONS:   Contact your healthcare provider if:   You have sharp or worsening pain during exercise or at rest     You have questions or concerns about your shoulder exercises  Safety tips:  Talk to your healthcare provider before you start an exercise program  A physical therapist can teach you how to do core strengthening exercises safely  Do the exercises on a mat or firm surface  A firm surface will support your spine and prevent low back pain  Do not do these exercises on a bed  Move slowly and smoothly  Avoid fast or jerky motions  Stop if you feel pain  Core exercises should not be painful  Stop if you feel pain  Breathe normally during core exercises  Do not hold your breath  This may cause an increase in blood pressure and prevent muscle strengthening  Your healthcare provider will tell you when to inhale and exhale during the exercise  Begin all of your exercises with abdominal bracing  Abdominal bracing helps warm up your core muscles  You can also practice abdominal bracing throughout the day  Lie on your back with your knees bent and feet flat on the floor  Place your arms in a relaxed position beside your body  Tighten your abdominal muscles  Pull your belly button in and up toward your spine  Hold for 5 seconds  Relax your muscles  Repeat 10 times  Core strengthening exercises: Your healthcare provider will tell you how often to do these exercises  The provider will also tell you how many repetitions of each exercise you should do  Hold each exercise for 5 seconds or as directed  As you get stronger, increase your hold to 10 to 15 seconds   You can do some of these exercises on a stability ball, or with a weight  Ask your healthcare provider how to use a stability ball or weight for these exercises:  Bridging:  Lie on your back with your knees bent and feet flat on the floor  Rest your arms at your side  Tighten your buttocks, and then lift your hips 1 inch off the floor  Hold for 5 seconds  When you can do this exercise without pain for 10 seconds, increase the distance you lift your hips  A good goal is to be able to lift your hips so that your shoulders, hips, and knees are in a straight line  Dead bug:  Lie on your back with your knees bent and feet flat on the floor  Place your arms in a relaxed position beside your body  Begin with abdominal bracing  Next, raise one leg, keeping your knee bent  Hold for 5 seconds  Repeat with the other leg  When you can do this exercise without pain for 10 to 15 seconds, you may raise one straight leg and hold  Repeat with the other leg  Quadruped:  Place your hands and knees on the floor  Keep your wrists directly below your shoulders and your knees directly below your hips  Pull your belly button in toward your spine  Do not flatten or arch your back  Tighten your abdominal muscles below your belly button  Hold for 5 seconds  When you can do this exercise without pain for 10 to 15 seconds, you may extend one arm and hold  Repeat on the other side  Side bridge exercises:      Standing side bridge:  Stand next to a wall and extend one arm toward the wall  Place your palm flat on the wall with your fingers pointing upward  Begin with abdominal bracing  Next, without moving your feet, slowly bend your arm to 90 degrees  Hold for 5 seconds  Repeat on the other side  When you can do this exercise without pain for 10 to 15 seconds, you may do the bent leg side bridge on the floor  Bent leg side bridge:  Lie on one side with your legs, hips, and shoulders in a straight line   Prop yourself up onto your forearm so your elbow is directly below your shoulder  Bend your knees back to 90 degrees  Begin with abdominal bracing  Next, lift your hips and balance yourself on your forearm and knees  Hold for 5 seconds  Repeat on the other side  When you can do this exercise without pain for 10 to 15 seconds, you may do the straight leg side bridge on the floor  Straight leg side bridge:  Lie on one side with your legs, hips, and shoulders in a straight line  Prop yourself up onto your forearm so your elbow is directly below your shoulder  Begin with abdominal bracing  Lift your hips off the floor and balance yourself on your forearm and the outside of your flexed foot  Do not let your ankle bend sideways  Hold for 5 seconds  Repeat on the other side  When you can do this exercise without pain for 10 to 15 seconds, ask your healthcare provider for more advanced exercises  Superman:  Lie on your stomach  Extend your arms forward on the floor  Tighten your abdominal muscles and lift your right hand and left leg off the floor  Hold this position  Slowly return to the starting position  Tighten your abdominal muscles and lift your left hand and right leg off the floor  Hold this position  Slowly return to the starting position  Clam:  Lie on your side with your knees bent  Put your bottom arm under your head to keep your neck in line  Put your top hand on your hip to keep your pelvis from moving  Put your heels together, and keep them together during this exercise  Slowly raise your top knee toward the ceiling  Then lower your leg so your knees are together  Repeat this exercise 10 times  Then switch sides and do the exercise 10 times with the other leg  Curl up:  Lie on your back with your knees bent and feet flat on the floor  Place your hands, palms down, underneath your lower back  Next, with your elbows on the floor, lift your shoulders and chest 2 to 3 inches off the floor   Keep your head in line with your shoulders  Hold this position  Slowly return to the starting position  Straight leg raises:  Lie on your back with one leg straight  Bend the other knee and place your foot flat on the floor  Tighten your abdominal muscles  Keep your leg straight and slowly lift it straight up 6 to 12 inches off the floor  Hold this position  Lower your leg slowly  Do as many repetitions as directed on this side  Repeat with the other leg  Plank:  Lie on your stomach  Bend your elbows and place your forearms flat on the floor  Lift your chest, stomach, and knees off the floor  Make sure your elbows are below your shoulders  Your body should be in a straight line  Do not let your hips or lower back sink to the ground  Squeeze your abdominal muscles together and hold for 15 seconds  To make this exercise harder, hold for 30 seconds or lift 1 leg at a time  Bicycles:  Lie on your back  Bend both knees and bring them toward your chest  Your calves should be parallel to the floor  Place the palms of your hands on the back of your head  Straighten your right leg and keep it lifted 2 inches off the floor  Raise your head and shoulders off the floor and twist towards your left  Keep your head and shoulders lifted  Bend your right knee while you straighten your left leg  Keep your left leg 2 inches off the floor  Twist your head and chest towards the left leg  Continue to straighten 1 leg at a time and twist        Follow up with your doctor as directed:  Write down your questions so you remember to ask them during your visits  © Copyright [employer] 2022 Information is for End User's use only and may not be sold, redistributed or otherwise used for commercial purposes  All illustrations and images included in CareNotes® are the copyrighted property of [employer]  or [facility] Hemalatha Phelps   The above information is an  only   It is not intended as medical advice for individual conditions or treatments  Talk to your doctor, nurse or pharmacist before following any medical regimen to see if it is safe and effective for you

## 2022-11-04 NOTE — PROGRESS NOTES
Assessment  1  Lumbar spondylosis  -     Ambulatory referral to Physical Therapy; Future    2  Chronic right-sided low back pain without sciatica  -     Ambulatory referral to Pain Management    3  Sacroiliac joint dysfunction of right side  -     Ambulatory referral to Physical Therapy; Future  -     Case request operating room: BLOCK / INJECTION SACROILIAC; Standing  -     Case request operating room: BLOCK / INJECTION SACROILIAC    4  Current mild episode of major depressive disorder without prior episode ([facility] [address] )    5  Chronic kidney disease, stage 3a ([facility] [address] )    6  BMI 40 0-44 9, adult (HCC)    Right sided axial low back pain described primarily by arthritic features  Aching, nagging, indolent, stabbing, throbbing features in axial low back without radicular components  5/5 strength bilaterally, negative SLR  Positive facet loading maneuvers in lumbar spine elicited pain, positive tenderness to palpation over lumbar paraspinal muscles  +YANE's, +GAENSLEN's, +SIJ loading right sided; +SUSAN FINGER test right sided  Findings correlate with prominent lumbar facet arthropathy seen throughout axial low back on x-ray  Currently she is neurologically intact without gait instability, saddle anesthesia or bowel/bladder abnormality  Risks, benefits alternative to SIJ injection, medial branch blocks and subsequent radiofrequency ablation of successful thoroughly discussed with patient  Handouts provided questions answered to patient satisfaction  Plan  -Right SIJ injection; f/u 2 weeks post procedure  -script provided for formal physical therapy for sacroiliac joint dysfunction, lumbar facet arthropathy; Physician directed home exercise plan as per [facility] demonstrated and handouts provided that patient plans to participate with for 1 hour, twice a week for the next 6 weeks  There are risks associated with opioid medications, including dependence, addiction and tolerance   The patient understands and agrees to use these medications only as prescribed  Potential side effects of the medications include, but are not limited to, constipation, drowsiness, addiction, impaired judgment and risk of fatal overdose if not taken as prescribed  The patient was warned against driving while taking sedation medications or operating heavy machinery  The patient voiced understanding  Sharing medications is a felony  At this point in time, the patient is showing no signs of addiction, abuse, diversion or suicidal ideation  [place] Prescription Drug Monitoring Program report was reviewed and was appropriate      Complete risks and benefits including bleeding, infection, tissue reaction, nerve injury and allergic reaction were discussed  The approach was demonstrated using models and literature was provided  Verbal and written consent was obtained  My impressions and treatment recommendations were discussed in detail with the patient who verbalized understanding and had no further questions  Discharge instructions were provided  I personally saw and examined the patient and I agree with the above discussed plan of care  No orders of the defined types were placed in this encounter  History of Present Illness    Orvel [place] is a 79 y o  female with pmhx of obesity, depression, GERD, CKD presenting with chronic low back pain described primarily as arthritic in nature  She describes 8/10 low back pain that is worse in the mornings and worse at the end of the day  Pain is particularly worse in right low axial back  The pain is characterized by achy, nagging, indolent, crampy, stabbing pain in her axial low back  The patient describes that the pain is worse with standing for long periods of time on hard surfaces as well as with walking  The patient is a very active individual and feels as though this pain compromises her participation with independent activities of daily living   The pain can be debilitating at times and contribute to significant disability, compromising overall activity and independent activities of daily living  She has not yet trialed formal PT  Medications the patient has tried in the past include celexa, OTC tylenol  She describes no radicular symptoms and has good strength  She denies any weakness numbness or paresthesias  The patient denies any bowel or bladder dysfunction as well, saddle anesthesia or gait instability  I have personally reviewed and/or updated the patient's past medical history, past surgical history, family history, social history, current medications, allergies, and vital signs today  Review of Systems   Constitutional: Positive for activity change  HENT: Negative  Eyes: Negative  Respiratory: Negative  Cardiovascular: Negative  Gastrointestinal: Negative  Endocrine: Negative  Genitourinary: Negative  Musculoskeletal: Positive for arthralgias, back pain, gait problem and myalgias  Skin: Negative  Allergic/Immunologic: Negative  Neurological: Negative for weakness and numbness  Hematological: Negative  Psychiatric/Behavioral: Negative  All other systems reviewed and are negative        Patient Active Problem List   Diagnosis   • Chronic pain of right knee   • Chronic right shoulder pain   • Adhesive capsulitis of right shoulder   • Primary osteoarthritis of right knee   • Class 3 severe obesity due to excess calories with serious comorbidity and body mass index (BMI) of 40 0 to 44 9 in [facility])   • Chronic right-sided low back pain without sciatica   • Other spondylosis, lumbar region   • Current mild episode of major depressive disorder without prior episode ([facility] [address] )   • Chronic kidney disease, stage 3a ([facility] [address] )       Past Medical History:   Diagnosis Date   • Anxiety    • Arthritis     knees and feet   • Depression    • GERD (gastroesophageal reflux disease)    • Hypertension    • Sciatica    • Stomach ulcer        Past Surgical History: Procedure Laterality Date   • APPENDECTOMY     • CHOLECYSTECTOMY     • GASTRIC BYPASS     • HERNIA REPAIR         Family History   Problem Relation Age of Onset   • Hypertension Mother    • Heart attack Mother    • Stroke Mother    • Hypertension Father    • Heart attack Father    • No Known Problems Maternal Grandmother    • No Known Problems Maternal Grandfather    • No Known Problems Paternal Grandmother    • No Known Problems Paternal Grandfather    • Cancer Maternal Aunt         bladder cancer   • No Known Problems Paternal Aunt    • No Known Problems Paternal Aunt        Social History     Occupational History   • Not on file   Tobacco Use   • Smoking status: Never Smoker   • Smokeless tobacco: Never Used   Vaping Use   • Vaping Use: Never used   Substance and Sexual Activity   • Alcohol use: Yes   • Drug use: Never   • Sexual activity: Not on file       Current Outpatient Medications on File Prior to Visit   Medication Sig   • citalopram (CeleXA) 20 mg tablet Take 1 tablet by mouth 2 (two) times a day   • Cyanocobalamin (VITAMIN B-12 PO) Take by mouth   • Diclofenac Sodium (VOLTAREN) 1 % Apply 2 g topically 4 (four) times a day   • IRON-VITAMIN C PO Take by mouth   • losartan-hydrochlorothiazide (HYZAAR) 50-12 5 mg per tablet Take 1 tablet by mouth in the morning   • Multiple Minerals-Vitamins (CALCIUM CITRATE PLUS PO) Take by mouth   • pantoprazole (PROTONIX) 40 mg tablet Take 1 tablet by mouth daily   • torsemide (DEMADEX) 5 MG tablet prn   • VITAMIN D PO Take by mouth     No current facility-administered medications on file prior to visit         Allergies   Allergen Reactions   • Medical Tape Rash     Silicones, Other reaction(s): Erythema     • Wound Dressing Adhesive Rash     Tape adhesive causes a reaction         Physical Exam    /80 (BP Location: Right arm, Patient Position: Sitting, Cuff Size: Standard)   Pulse 89   Temp 98 4 °F (36 9 °C)   Ht 5' 9" (1 753 m)   Wt 135 kg (298 lb)   SpO2 96%   BMI 44 01 kg/m²     Constitutional: normal, well developed, well nourished, alert, in no distress and non-toxic and no overt pain behavior  and obese  Eyes: anicteric  HEENT: grossly intact  Neck: supple, symmetric, trachea midline and no masses   Pulmonary:even and unlabored  Cardiovascular:No edema or pitting edema present  Skin:Normal without rashes or lesions and well hydrated  Psychiatric:Mood and affect appropriate  Neurologic:Cranial Nerves II-XII grossly intact Sensation grossly intact; no clonus negative nunez's  Reflexes 2+ and brisk  SLR negative bilaterally  Musculoskeletal: antalgic gait, ambulates with cane  5/5 strength bilaterally with AROM in all extremities  Unable to perform normal heel toe and tip toe walking  Significant pain with lumbar facet loading bilaterally and with lateral spine rotation  ttp over lumbar paraspinal muscles  Positive right sided kassy's test, gaenslen's, SIJ loading right sided  Imaging    LUMBAR SPINE     INDICATION:   M54 50: Low back pain, unspecified  G89 29: Other chronic pain      COMPARISON:  None     VIEWS:  XR SPINE LUMBAR 2 OR 3 VIEWS INJURY        FINDINGS:     There are 5 non rib bearing lumbar vertebral bodies      No evidence of acute fracture      Schmorl's node at T11      Degenerative spondylosis is seen in multiple levels disc space narrowing most pronounced at L4-5 and L5-S1      Grade 1 retrolisthesis of L3 as compared to L4 and L4 as compared to L5      Facet hypertrophy is most pronounced at L5-S1 with sclerosis       No significant lumbar degenerative change noted      The pedicles appear intact  Surgical clips are noted in the right upper quadrant        IMPRESSION:     Degenerative spondylosis as above    No compression deformity         Workstation performed: [id number]

## 2022-11-04 NOTE — H&P (VIEW-ONLY)
Assessment  1  Lumbar spondylosis  -     Ambulatory referral to Physical Therapy; Future    2  Chronic right-sided low back pain without sciatica  -     Ambulatory referral to Pain Management    3  Sacroiliac joint dysfunction of right side  -     Ambulatory referral to Physical Therapy; Future  -     Case request operating room: BLOCK / INJECTION SACROILIAC; Standing  -     Case request operating room: BLOCK / INJECTION SACROILIAC    4  Current mild episode of major depressive disorder without prior episode ([facility] [place] [address] )    5  Chronic kidney disease, stage 3a ([facility] [place] [address] )    6  BMI 40 0-44 9, adult (HCC)    Right sided axial low back pain described primarily by arthritic features  Aching, nagging, indolent, stabbing, throbbing features in axial low back without radicular components  5/5 strength bilaterally, negative SLR  Positive facet loading maneuvers in lumbar spine elicited pain, positive tenderness to palpation over lumbar paraspinal muscles  +YANE's, +GAENSLEN's, +SIJ loading right sided; +SUSAN FINGER test right sided  Findings correlate with prominent lumbar facet arthropathy seen throughout axial low back on x-ray  Currently she is neurologically intact without gait instability, saddle anesthesia or bowel/bladder abnormality  Risks, benefits alternative to SIJ injection, medial branch blocks and subsequent radiofrequency ablation of successful thoroughly discussed with patient  Handouts provided questions answered to patient satisfaction  Plan  -Right SIJ injection; f/u 2 weeks post procedure  -script provided for formal physical therapy for sacroiliac joint dysfunction, lumbar facet arthropathy; Physician directed home exercise plan as per [facility] demonstrated and handouts provided that patient plans to participate with for 1 hour, twice a week for the next 6 weeks  There are risks associated with opioid medications, including dependence, addiction and tolerance   The patient understands and agrees to use these medications only as prescribed  Potential side effects of the medications include, but are not limited to, constipation, drowsiness, addiction, impaired judgment and risk of fatal overdose if not taken as prescribed  The patient was warned against driving while taking sedation medications or operating heavy machinery  The patient voiced understanding  Sharing medications is a felony  At this point in time, the patient is showing no signs of addiction, abuse, diversion or suicidal ideation  [place] Prescription Drug Monitoring Program report was reviewed and was appropriate      Complete risks and benefits including bleeding, infection, tissue reaction, nerve injury and allergic reaction were discussed  The approach was demonstrated using models and literature was provided  Verbal and written consent was obtained  My impressions and treatment recommendations were discussed in detail with the patient who verbalized understanding and had no further questions  Discharge instructions were provided  I personally saw and examined the patient and I agree with the above discussed plan of care  No orders of the defined types were placed in this encounter  History of Present Illness    Kristofer Doran is a 79 y o  female with pmhx of obesity, depression, GERD, CKD presenting with chronic low back pain described primarily as arthritic in nature  She describes 8/10 low back pain that is worse in the mornings and worse at the end of the day  Pain is particularly worse in right low axial back  The pain is characterized by achy, nagging, indolent, crampy, stabbing pain in her axial low back  The patient describes that the pain is worse with standing for long periods of time on hard surfaces as well as with walking  The patient is a very active individual and feels as though this pain compromises her participation with independent activities of daily living   The pain can be debilitating at times and contribute to significant disability, compromising overall activity and independent activities of daily living  She has not yet trialed formal PT  Medications the patient has tried in the past include celexa, OTC tylenol  She describes no radicular symptoms and has good strength  She denies any weakness numbness or paresthesias  The patient denies any bowel or bladder dysfunction as well, saddle anesthesia or gait instability  I have personally reviewed and/or updated the patient's past medical history, past surgical history, family history, social history, current medications, allergies, and vital signs today  Review of Systems   Constitutional: Positive for activity change  HENT: Negative  Eyes: Negative  Respiratory: Negative  Cardiovascular: Negative  Gastrointestinal: Negative  Endocrine: Negative  Genitourinary: Negative  Musculoskeletal: Positive for arthralgias, back pain, gait problem and myalgias  Skin: Negative  Allergic/Immunologic: Negative  Neurological: Negative for weakness and numbness  Hematological: Negative  Psychiatric/Behavioral: Negative  All other systems reviewed and are negative        Patient Active Problem List   Diagnosis   • Chronic pain of right knee   • Chronic right shoulder pain   • Adhesive capsulitis of right shoulder   • Primary osteoarthritis of right knee   • Class 3 severe obesity due to excess calories with serious comorbidity and body mass index (BMI) of 40 0 to 44 9 in [facility])   • Chronic right-sided low back pain without sciatica   • Other spondylosis, lumbar region   • Current mild episode of major depressive disorder without prior episode ([facility] [address] )   • Chronic kidney disease, stage 3a ([facility] [address] )       Past Medical History:   Diagnosis Date   • Anxiety    • Arthritis     knees and feet   • Depression    • GERD (gastroesophageal reflux disease)    • Hypertension    • Sciatica    • Stomach ulcer        Past Surgical History: Procedure Laterality Date   • APPENDECTOMY     • CHOLECYSTECTOMY     • GASTRIC BYPASS     • HERNIA REPAIR         Family History   Problem Relation Age of Onset   • Hypertension Mother    • Heart attack Mother    • Stroke Mother    • Hypertension Father    • Heart attack Father    • No Known Problems Maternal Grandmother    • No Known Problems Maternal Grandfather    • No Known Problems Paternal Grandmother    • No Known Problems Paternal Grandfather    • Cancer Maternal Aunt         bladder cancer   • No Known Problems Paternal Aunt    • No Known Problems Paternal Aunt        Social History     Occupational History   • Not on file   Tobacco Use   • Smoking status: Never Smoker   • Smokeless tobacco: Never Used   Vaping Use   • Vaping Use: Never used   Substance and Sexual Activity   • Alcohol use: Yes   • Drug use: Never   • Sexual activity: Not on file       Current Outpatient Medications on File Prior to Visit   Medication Sig   • citalopram (CeleXA) 20 mg tablet Take 1 tablet by mouth 2 (two) times a day   • Cyanocobalamin (VITAMIN B-12 PO) Take by mouth   • Diclofenac Sodium (VOLTAREN) 1 % Apply 2 g topically 4 (four) times a day   • IRON-VITAMIN C PO Take by mouth   • losartan-hydrochlorothiazide (HYZAAR) 50-12 5 mg per tablet Take 1 tablet by mouth in the morning   • Multiple Minerals-Vitamins (CALCIUM CITRATE PLUS PO) Take by mouth   • pantoprazole (PROTONIX) 40 mg tablet Take 1 tablet by mouth daily   • torsemide (DEMADEX) 5 MG tablet prn   • VITAMIN D PO Take by mouth     No current facility-administered medications on file prior to visit         Allergies   Allergen Reactions   • Medical Tape Rash     Silicones, Other reaction(s): Erythema     • Wound Dressing Adhesive Rash     Tape adhesive causes a reaction         Physical Exam    /80 (BP Location: Right arm, Patient Position: Sitting, Cuff Size: Standard)   Pulse 89   Temp 98 4 °F (36 9 °C)   Ht 5' 9" (1 753 m)   Wt 135 kg (298 lb)   SpO2 96%   BMI 44 01 kg/m²     Constitutional: normal, well developed, well nourished, alert, in no distress and non-toxic and no overt pain behavior  and obese  Eyes: anicteric  HEENT: grossly intact  Neck: supple, symmetric, trachea midline and no masses   Pulmonary:even and unlabored  Cardiovascular:No edema or pitting edema present  Skin:Normal without rashes or lesions and well hydrated  Psychiatric:Mood and affect appropriate  Neurologic:Cranial Nerves II-XII grossly intact Sensation grossly intact; no clonus negative nunez's  Reflexes 2+ and brisk  SLR negative bilaterally  Musculoskeletal: antalgic gait, ambulates with cane  5/5 strength bilaterally with AROM in all extremities  Unable to perform normal heel toe and tip toe walking  Significant pain with lumbar facet loading bilaterally and with lateral spine rotation  ttp over lumbar paraspinal muscles  Positive right sided kassy's test, gaenslen's, SIJ loading right sided  Imaging    LUMBAR SPINE     INDICATION:   M54 50: Low back pain, unspecified  G89 29: Other chronic pain      COMPARISON:  None     VIEWS:  XR SPINE LUMBAR 2 OR 3 VIEWS INJURY        FINDINGS:     There are 5 non rib bearing lumbar vertebral bodies      No evidence of acute fracture      Schmorl's node at T11      Degenerative spondylosis is seen in multiple levels disc space narrowing most pronounced at L4-5 and L5-S1      Grade 1 retrolisthesis of L3 as compared to L4 and L4 as compared to L5      Facet hypertrophy is most pronounced at L5-S1 with sclerosis       No significant lumbar degenerative change noted      The pedicles appear intact  Surgical clips are noted in the right upper quadrant        IMPRESSION:     Degenerative spondylosis as above    No compression deformity         Workstation performed: [id number]

## 2022-11-14 ENCOUNTER — EVALUATION (OUTPATIENT)
Dept: PHYSICAL THERAPY | Facility: CLINIC | Age: 67
End: 2022-11-14

## 2022-11-14 DIAGNOSIS — M47.816 LUMBAR SPONDYLOSIS: ICD-10-CM

## 2022-11-14 DIAGNOSIS — M53.3 SACROILIAC JOINT DYSFUNCTION OF RIGHT SIDE: ICD-10-CM

## 2022-11-14 NOTE — PROGRESS NOTES
PT Evaluation     Today's date: 2022  Patient name: Kait Kent  : 1955  MRN: [id number]  Referring provider: Alvarado Garcia MD  Dx:   Encounter Diagnosis     ICD-10-CM    1  Lumbar spondylosis  M47 816 Ambulatory referral to Physical Therapy     PT plan of care cert/re-cert   2  Sacroiliac joint dysfunction of right side  M53 3 Ambulatory referral to Physical Therapy     PT plan of care cert/re-cert       Start Time: 1600  Stop Time: 1700  Total time in clinic (min): 60 minutes    Assessment  Assessment details: Pt presents to PT with exacerbation of chronic Hx of LBP  Significant findings from examination include: decreased/painful Lumbar ROM, (-) neurodynamic testing, R knee hypomobility, decreased functional mobility and transfer ability, and decreased spinal stabilizer mm activation  These findings are consistent with chronic non-specific LBP, limiting their ADL performance and (I) functional mobility  Pt would benefit from course of PT to resolve the above mentioned impairments and facilitate return to PLOF       Impairments: abnormal gait, abnormal or restricted ROM, activity intolerance, impaired physical strength and lacks appropriate home exercise program    Symptom irritability: moderateUnderstanding of Dx/Px/POC: good   Prognosis: good    Goals  Short Term Functional Goals:   In 4 weeks patient will:   Pt will report 3/10 Lumbar pain on VAS to allow work tasks at desk    Improve functional mobility: Increased sitting and standing tolerance to 30 and 10 minutes respectively before changing position    Patient to be independent with HEP to maximize improvement in functional mobility    pt will increase FOTO score by 10pts to reflect a statistically significant improvement     ?   Long Term Functional Goals:    In 6 weeks patient will:   -Pt will report 2/10 Lumbar pain on VAS to allow home and self-care tasks    -Patient will demonstrate [facility] pain-free Lumbar motion to better complete ADLs    -Improve functional mobility:  Patient will be able to walk and establish exercise program    -The patient will go for 30 minutes walks 3-4 times per week and stretch daily to improve functional mobility and activity tolerance    -Pt will score at or above predicted discharge FOTO score of 54 to reflect improvement in subjective functional ability  Plan  Patient would benefit from: skilled physical therapy  Referral necessary: No  Planned therapy interventions: manual therapy, neuromuscular re-education, patient education, therapeutic activities, therapeutic exercise and home exercise program  Frequency: 2x week  Duration in weeks: 6  Plan of Care beginning date: 2022  Plan of Care expiration date: 2022  Treatment plan discussed with: patient        Subjective Evaluation    History of Present Illness  Date of onset: 10/1/2022  Mechanism of injury: Sonia Restrepo reports Chronic Hx of lumbar pain that started increasing 6-8 weeks ago  CC at this time is constant dull ache  Occ pain wrapping around the R hip to groin  Symptoms aggravated by prolonged positioning  Symptoms improved by ice an repositioning  Conservative treatment to this point includes: meds and ice     Symptoms change throughout the day:  No   Red Flags: No   Relevant Surgical Hx: no  Mechanism of injury: n/a  Positional preference:  No   Symptoms distal to glute fold: Yes (Ant Thigh)   No            Not a recurrent problem   Quality of life: good    Pain  Current pain rating: 3  At best pain ratin  At worst pain ratin  Relieving factors: ice  Progression: worsening    Social Support  Steps to enter house: yes  6  Stairs in house: no   L[facility] in: [facility]  Lives with: spouse    Employment status: working  Patient Goals  Patient goals for therapy: decreased pain, increased strength, independence with ADLs/IADLs and increased motion          Objective     Concurrent Complaints  Negative for night pain, disturbed sleep, bladder dysfunction, bowel dysfunction and saddle (S4) numbness    Neurological Testing     Reflexes   Left   Patellar (L4): normal (2+)  Achilles (S1): normal (2+)    Right   Patellar (L4): normal (2+)  Achilles (S1): normal (2+)    Active Range of Motion     Lumbar   Flexion:  Restriction level: minimal  Extension:  Restriction level: maximal  Left lateral flexion:  Restriction level: maximal  Right lateral flexion:  Restriction level: maximal  Left rotation:  Restriction level: moderate  Right rotation:  Restriction level: moderate  Left Knee   Flexion: 130 degrees   Extension: -5 degrees     Right Knee   Flexion: 125 degrees   Extension: -20 degrees     Strength/Myotome Testing     Lumbar   Left   Normal strength    Right Hip   Planes of Motion   Flexion: WFL  Extension: WFL    Right Knee   Flexion: 4-  Extension: WFL    Right Ankle/Foot   Dorsiflexion: WFL  Plantar flexion: WFL       Tests     Lumbar     Left   Negative passive SLR and slump test      Right   Negative passive SLR and slump test               Precautions: none     Daily Treatment Diary:      Initial Evaluation Date: 11/14/22  Compliance 11/14                     Visit Number 1                    Re-Eval  IE                 MC   Foto Captured y                           11/14                     Manual                      L/S STM                      Piriformis TpR                      LAD             L/S Mobs                                   Ther-Ex                      Active Warm-up                      LTR x10                     SKTC                      Hooklying ADD x10 5"                     Piriformis Stretch             Clamshells                      90/90 stretch                      Supine Bridge 10x5"                     Parag pose                                                                  Neuro Re-Ed                      TrA brace c Uni march 10x5"                     BKFO             TrA [employer] 3-way Ther-Act                                                               Modalities                      IFC with P

## 2022-11-17 ENCOUNTER — OFFICE VISIT (OUTPATIENT)
Dept: PHYSICAL THERAPY | Facility: CLINIC | Age: 67
End: 2022-11-17

## 2022-11-17 DIAGNOSIS — M53.3 SACROILIAC JOINT DYSFUNCTION OF RIGHT SIDE: Primary | ICD-10-CM

## 2022-11-17 DIAGNOSIS — M47.816 LUMBAR SPONDYLOSIS: ICD-10-CM

## 2022-11-21 ENCOUNTER — OFFICE VISIT (OUTPATIENT)
Dept: PHYSICAL THERAPY | Facility: CLINIC | Age: 67
End: 2022-11-21

## 2022-11-21 DIAGNOSIS — M53.3 SACROILIAC JOINT DYSFUNCTION OF RIGHT SIDE: ICD-10-CM

## 2022-11-21 DIAGNOSIS — M47.816 LUMBAR SPONDYLOSIS: Primary | ICD-10-CM

## 2022-11-21 NOTE — PROGRESS NOTES
Daily Note     Today's date: 2022  Patient name: Apple Lewis  : 1955  MRN: [id number]  Referring provider: Rodriguez Gray MD  Dx:   Encounter Diagnosis     ICD-10-CM    1  Lumbar spondylosis  M47 816       2  Sacroiliac joint dysfunction of right side  M53 3           Start Time: 1600  Stop Time: 1645  Total time in clinic (min): 45 minutes    Subjective: Pt reports she has increased pain over the weekend due to not perf HEP  Difficulty again c transfers  Pain reported as 3/10 at start of session  Objective: See treatment diary below  Assessment:  Apple Lewis was progressed with PT interventions, focusing on appropriate technique and intensity  She req extensive VC for proper tech and perf  They remain limited with Abdominal mm activation  Pt would benefit from continued skilled PT to resolve remaining functional impairments and facilitate return to PLOF  Plan: Continue per plan of care  Progress treatment as tolerated         Precautions: none     Daily Treatment Diary:      Initial Evaluation Date: 22  Compliance                  Visit Number 1 2  3                 Re-Eval  IE                 MC   Foto Captured y                                            Manual                      L/S STM                      Piriformis TpR                      LAD             L/S Mobs                                   Ther-Ex                      Active Warm-up                      LTR x10  x10  x20  x20                 SKTC                      Hooklying ADD x10 5"  2x10 5" ea  2x10 5" ea                 Piriformis Stretch             Clamshells   Grn 2x10 5" ea  Grn 2x10 5" ea                 90/90 stretch                      Supine Bridge 10x5"  2x10 5"  Grn 2x10 5"                  Hooklying ABD  Alberto 2x10 5"  Alberto 2x10 5"                 Standing Hip ABD/Ext   2x10 ea (B)  2x10 ea (B)                                       Neuro Re-Ed TrA brace c Uni march 10x5"  10x5"  10x5"                 BKFO             TrA Ball Press 3-way  Straight only 2x10 5" ea   Straight only 2x10 5" eaz                                    Ther-Act                                                               Modalities                      IFC with P

## 2022-11-22 ENCOUNTER — HOSPITAL ENCOUNTER (OUTPATIENT)
Facility: HOSPITAL | Age: 67
Setting detail: OUTPATIENT SURGERY
Discharge: HOME | End: 2022-11-22
Attending: ANESTHESIOLOGY | Admitting: ANESTHESIOLOGY

## 2022-11-22 ENCOUNTER — APPOINTMENT (OUTPATIENT)
Dept: RADIOLOGY | Facility: HOSPITAL | Age: 67
End: 2022-11-22

## 2022-11-22 VITALS
TEMPERATURE: 97.9 F | HEART RATE: 90 BPM | RESPIRATION RATE: 20 BRPM | SYSTOLIC BLOOD PRESSURE: 124 MMHG | OXYGEN SATURATION: 90 % | BODY MASS INDEX: 43.4 KG/M2 | WEIGHT: 293 LBS | DIASTOLIC BLOOD PRESSURE: 65 MMHG | HEIGHT: 69 IN

## 2022-11-22 RX ORDER — ALPRAZOLAM 0.5 MG/1
0.5 TABLET ORAL ONCE
Status: COMPLETED | OUTPATIENT
Start: 2022-11-22 | End: 2022-11-22

## 2022-11-22 RX ORDER — BUPIVACAINE HCL/PF 2.5 MG/ML
4 VIAL (ML) INJECTION ONCE
Status: DISCONTINUED | OUTPATIENT
Start: 2022-11-22 | End: 2022-11-22 | Stop reason: HOSPADM

## 2022-11-22 RX ORDER — METHYLPREDNISOLONE ACETATE 80 MG/ML
80 INJECTION, SUSPENSION INTRA-ARTICULAR; INTRALESIONAL; INTRAMUSCULAR; PARENTERAL; SOFT TISSUE ONCE
Status: COMPLETED | OUTPATIENT
Start: 2022-11-22 | End: 2022-11-22

## 2022-11-22 RX ORDER — LIDOCAINE HYDROCHLORIDE 10 MG/ML
5 INJECTION, SOLUTION EPIDURAL; INFILTRATION; INTRACAUDAL; PERINEURAL ONCE
Status: COMPLETED | OUTPATIENT
Start: 2022-11-22 | End: 2022-11-22

## 2022-11-22 RX ADMIN — ALPRAZOLAM 0.5 MG: 0.5 TABLET ORAL at 09:44

## 2022-11-22 NOTE — DISCHARGE INSTRUCTIONS
YOUR 2 WEEK FOLLOW UP HAS BEEN SCHEDULED; IF YOU WISH TO CHANGE THE FOLLOW UP, PLEASE CALL THE [facility] AT [facility]: [phone]    Sacroiliac Joint Injection   WHAT YOU NEED TO KNOW:   A sacroiliac (SI) joint injection is done to diagnose or treat pain from sacroiliac joint syndrome  The pain caused by this syndrome may be felt in your lower back, buttocks, groin, and your thigh  DISCHARGE INSTRUCTIONS:   Seek care immediately if:   You have a fever  You have increased redness, or swelling around the injection site  You have drainage from the injection site  You are not able to walk or move your leg  Contact your healthcare provider if:   Your pain does not get better within 5 days  You have new symptoms  You have questions or concerns about your condition or care  Self-care:   Drink liquids as directed  Liquids will help flush the contrast material out of your body  Ask how much liquid to drink and which liquids are best for you  Apply ice to your injection site  Ice helps decrease swelling and pain  Use an ice pack, or put crushed ice in a plastic bag  Cover it with a towel and place it on your low back for 15 to 20 minutes every hour or as directed  Do not take a bath or get into a hot tub after your procedure  Take a shower instead  Soaking your puncture site in a bath or hot tub increases your risk for infection  Limit physical activity that causes pain  Rest as needed  Ask your healthcare provider how long you should limit activity  Keep a pain diary  Write down when your pain happens, how severe it is, and any other symptoms you have with your pain  A diary will help you keep track of your pain  It may also help your healthcare provider find out what is causing your pain  Follow up with your healthcare provider as directed: You may need more injections or other treatments  Bring your pain diary to your visits   Write down your questions so you remember to ask them during your visits  © Copyright [employer] 2022 Information is for End User's use only and may not be sold, redistributed or otherwise used for commercial purposes  All illustrations and images included in CareNotes® are the copyrighted property of A D A M , Inc  or Gaby Gilmore  The above information is an  only  It is not intended as medical advice for individual conditions or treatments  Talk to your doctor, nurse or pharmacist before following any medical regimen to see if it is safe and effective for you

## 2022-11-22 NOTE — PROCEDURES
Pre-procedure Diagnosis: Sacroiliitis/Sacroiliac joint dysfunction  Post-procedure Diagnosis: Sacroiliitis/Sacroiliac joint dysfunction  Operation Title(s):  1  [RIGHT] Sacroiliac joint injection      2  Intraoperative fluoroscopy  Attending Surgeon:   Landon Marino MD  Anesthesia:   Local    Indications: The patient is a 79y o  year-old female with a diagnosis of RIGHT sacroiliitis/Sacroiliac joint dysfunction  The patient's history and physical exam were reviewed  The risks, benefits and alternatives to the procedure were discussed, and all questions were answered to the patient's satisfaction  The patient agreed to proceed, and written informed consent was obtained  Procedure in Detail: The patient was brought into the procedure room and placed in the prone position on the fluoroscopy table  The low back and upper buttock were prepped with chloraprep and draped in a sterile manner  AP fluoroscopy was used to visualize the [RIGHT] sacroiliac joint  The fluoroscopic beam was then obliqued until the anterior and posterior margins of the joint were aligned  The inferior margin of the joint was identified and marked  The skin and subcutaneous tissues in the area were anesthetized with 1% lidocaine  A 22-gauge, 5-inch spinal needle was advanced toward the identified point under fluoroscopic guidance  Once the targeted point was reached and the joint space was entered, negative aspiration was confirmed, and 1ml of Omnipaque 240 contrast was injected  The joint space was appropriately outlined  Then, after negative aspiration, a solution consisting of 4mL 0 25% bupivacaine and 1mL Depo-Medrol (80mg/ml) were easily injected  The needle was removed with a 1% lidocaine flush  The patient's back was cleaned and a bandage was placed over the sites of needle insertion  Disposition: The patient tolerated the procedure well and there were no apparent complications   Vital signs remained stable throughout the procedure  The patient was taken to the recovery area where written discharge instructions for the procedure were given      Estimated Blood Loss: None  Specimens Obtained: N/A

## 2022-11-22 NOTE — OP NOTE
OPERATIVE REPORT  PATIENT NAME: Dashawn Moreno    :  1955  MRN: [id number]  Pt Location:  GI ROOM 01    SURGERY DATE: 2022    Surgeon(s) and Role: Gadiel Delgado MD - Primary    Preop Diagnosis:  Sacroiliac joint dysfunction of right side [M53 3]    Post-Op Diagnosis Codes:     * Sacroiliac joint dysfunction of right side [M53 3]    Procedure(s) (LRB):  BLOCK / INJECTION SACROILIAC (Right)    Specimen(s):  * No specimens in log *    Estimated Blood Loss:   Minimal    Drains:  * No LDAs found *    Anesthesia Type:   Local    Operative Indications:  Sacroiliac joint dysfunction of right side [M53 3]    Operative Findings:  Contrast spread into right sacroiliac joint under fluoroscopic guidance      Complications:   None    Procedure and Technique:  Please see detailed procedure note    I was present for the entire procedure    Patient Disposition:  PACU     SIGNATURE: Janae Fuentes MD  DATE: 2022  TIME: 10:03 AM

## 2022-11-22 NOTE — INTERVAL H&P NOTE
H&P reviewed  After examining the patient I find no changes in the patients condition since the H&P had been written      Vitals:    11/22/22 0938   BP: 168/91   Pulse: 103   Resp: 18   Temp: 97 5 °F (36 4 °C)   SpO2: 95%

## 2022-11-23 ENCOUNTER — APPOINTMENT (OUTPATIENT)
Dept: PHYSICAL THERAPY | Facility: CLINIC | Age: 67
End: 2022-11-23

## 2022-11-28 ENCOUNTER — APPOINTMENT (OUTPATIENT)
Dept: PHYSICAL THERAPY | Facility: CLINIC | Age: 67
End: 2022-11-28

## 2022-11-29 ENCOUNTER — TELEPHONE (OUTPATIENT)
Dept: PAIN MEDICINE | Facility: CLINIC | Age: 67
End: 2022-11-29

## 2022-12-01 ENCOUNTER — OFFICE VISIT (OUTPATIENT)
Dept: URGENT CARE | Facility: CLINIC | Age: 67
End: 2022-12-01

## 2022-12-01 VITALS
DIASTOLIC BLOOD PRESSURE: 75 MMHG | HEART RATE: 78 BPM | SYSTOLIC BLOOD PRESSURE: 155 MMHG | BODY MASS INDEX: 42.51 KG/M2 | HEIGHT: 69 IN | OXYGEN SATURATION: 93 % | WEIGHT: 287 LBS | RESPIRATION RATE: 20 BRPM | TEMPERATURE: 97.5 F

## 2022-12-01 DIAGNOSIS — J06.9 VIRAL URI WITH COUGH: Primary | ICD-10-CM

## 2022-12-01 LAB
SARS-COV-2 AG UPPER RESP QL IA: NEGATIVE
VALID CONTROL: NORMAL

## 2022-12-01 RX ORDER — BENZONATATE 200 MG/1
200 CAPSULE ORAL 3 TIMES DAILY PRN
Qty: 20 CAPSULE | Refills: 0 | Status: SHIPPED | OUTPATIENT
Start: 2022-12-01 | End: 2022-12-15

## 2022-12-01 NOTE — PROGRESS NOTES
[facility]        NAME: Sherin Mendiola is a 79 y o  female  : 1955    MRN: [id number]  DATE: 2022  TIME: 2:36 PM    Assessment and Plan   Viral URI with cough [J06 9]  1  Viral URI with cough  Poct Covid 19 Rapid Antigen Test    benzonatate (TESSALON) 200 MG capsule        Advised patient to discontinue DayQuil NyQuil due to hypertension  Advised to use over-the-counter Coricidin HBP  Patient Instructions   Drink plenty of fluids  May use over the counter cold medications for symptomatic treatment  Do not use medications with Pseudoephedrine or Phenylphrine if you have high blood pressure because it may worsen your blood pressure  Follow up with your PCP in 3-5 days if your symptoms do not improve or if you have any concerns  Go to the ER if symptoms become severe  Follow up with PCP in 3-5 days  Proceed to  ER if symptoms worsen  Chief Complaint     Chief Complaint   Patient presents with   • Cold Like Symptoms     2 days ago patient started runny nose, headache, cough and sneezing  Still congested and coughing  No fevers  History of Present Illness       Patient is a 60-year-old female with significant past medical history of hypertension and CKD stage 3 presents the office complaining of headache, congestion, rhinorrhea, sore throat, and cough for 2 days  He denies fever, chills, SOB, CP, nausea, vomiting, abdominal pain or rashes  She has been using over-the-counter cold and flu medications with good relief of symptoms  Review of Systems   Review of Systems   Constitutional: Negative for chills and fever  HENT: Positive for congestion, rhinorrhea and sore throat  Respiratory: Positive for cough  Negative for shortness of breath  Cardiovascular: Negative for chest pain and palpitations  Gastrointestinal: Negative for abdominal pain, diarrhea, nausea and vomiting  Musculoskeletal: Negative for myalgias  Skin: Negative for rash     Neurological: Positive for headaches  Negative for dizziness and light-headedness           Current Medications       Current Outpatient Medications:   •  benzonatate (TESSALON) 200 MG capsule, Take 1 capsule (200 mg total) by mouth 3 (three) times a day as needed for cough for up to 14 days, Disp: 20 capsule, Rfl: 0  •  citalopram (CeleXA) 20 mg tablet, Take 1 tablet by mouth 2 (two) times a day, Disp: , Rfl:   •  Cyanocobalamin (VITAMIN B-12 PO), Take by mouth, Disp: , Rfl:   •  Diclofenac Sodium (VOLTAREN) 1 %, Apply 2 g topically 4 (four) times a day, Disp: 100 g, Rfl: 0  •  IRON-VITAMIN C PO, Take by mouth, Disp: , Rfl:   •  losartan-hydrochlorothiazide (HYZAAR) 50-12 5 mg per tablet, Take 1 tablet by mouth in the morning, Disp: , Rfl:   •  Multiple Minerals-Vitamins (CALCIUM CITRATE PLUS PO), Take by mouth, Disp: , Rfl:   •  pantoprazole (PROTONIX) 40 mg tablet, Take 1 tablet by mouth daily, Disp: , Rfl:   •  torsemide (DEMADEX) 5 MG tablet, prn, Disp: , Rfl:   •  VITAMIN D PO, Take by mouth, Disp: , Rfl:     Current Allergies     Allergies as of 12/01/2022 - Reviewed 12/01/2022   Allergen Reaction Noted   • Medical tape Rash 12/11/2006   • Wound dressing adhesive Rash 09/06/2022            The following portions of the patient's history were reviewed and updated as appropriate: allergies, current medications, past family history, past medical history, past social history, past surgical history and problem list      Past Medical History:   Diagnosis Date   • Anxiety    • Arthritis     knees and feet   • Depression    • GERD (gastroesophageal reflux disease)    • Hypertension    • Sciatica    • Stomach ulcer        Past Surgical History:   Procedure Laterality Date   • APPENDECTOMY     • CHOLECYSTECTOMY     • GASTRIC BYPASS     • HERNIA REPAIR     • ND INJECTION,SACROILIAC JOINT Right 11/22/2022    Procedure: BLOCK / INJECTION SACROILIAC;  Surgeon: Donna Renae MD;  Location: [facility];  Service: Pain Management Family History   Problem Relation Age of Onset   • Hypertension Mother    • Heart attack Mother    • Stroke Mother    • Hypertension Father    • Heart attack Father    • No Known Problems Maternal Grandmother    • No Known Problems Maternal Grandfather    • No Known Problems Paternal Grandmother    • No Known Problems Paternal Grandfather    • Cancer Maternal Aunt         bladder cancer   • No Known Problems Paternal Aunt    • No Known Problems Paternal Aunt          Medications have been verified  Objective   /75   Pulse 78   Temp 97 5 °F (36 4 °C)   Resp 20   Ht 5' 9" (1 753 m)   Wt 130 kg (287 lb)   SpO2 93%   BMI 42 38 kg/m²   No LMP recorded  Patient is postmenopausal        Physical Exam     Physical Exam  Vitals and nursing note reviewed  Constitutional:       Appearance: Normal appearance  She is well-developed  HENT:      Head: Normocephalic and atraumatic  Right Ear: Tympanic membrane, ear canal and external ear normal       Left Ear: Tympanic membrane, ear canal and external ear normal       Nose: Congestion present  Mouth/Throat:      Pharynx: Uvula midline  Eyes:      General: Lids are normal       Conjunctiva/sclera: Conjunctivae normal       Pupils: Pupils are equal, round, and reactive to light  Cardiovascular:      Rate and Rhythm: Normal rate and regular rhythm  Pulses: Normal pulses  Heart sounds: Normal heart sounds  No murmur heard  No friction rub  No gallop  Pulmonary:      Effort: Pulmonary effort is normal       Breath sounds: Normal breath sounds  No wheezing, rhonchi or rales  Musculoskeletal:         General: Normal range of motion  Cervical back: Neck supple  Lymphadenopathy:      Cervical: No cervical adenopathy  Skin:     General: Skin is warm and dry  Capillary Refill: Capillary refill takes less than 2 seconds  Neurological:      Mental Status: She is alert         POC rapid COVID-19 negative

## 2022-12-16 ENCOUNTER — OFFICE VISIT (OUTPATIENT)
Dept: PAIN MEDICINE | Facility: CLINIC | Age: 67
End: 2022-12-16

## 2022-12-16 VITALS
HEART RATE: 89 BPM | BODY MASS INDEX: 43.4 KG/M2 | WEIGHT: 293 LBS | SYSTOLIC BLOOD PRESSURE: 134 MMHG | TEMPERATURE: 98 F | DIASTOLIC BLOOD PRESSURE: 72 MMHG | HEIGHT: 69 IN | RESPIRATION RATE: 20 BRPM

## 2022-12-16 DIAGNOSIS — G89.29 CHRONIC RIGHT-SIDED LOW BACK PAIN WITHOUT SCIATICA: ICD-10-CM

## 2022-12-16 DIAGNOSIS — M54.50 CHRONIC RIGHT-SIDED LOW BACK PAIN WITHOUT SCIATICA: ICD-10-CM

## 2022-12-16 DIAGNOSIS — M47.896 OTHER SPONDYLOSIS, LUMBAR REGION: Primary | ICD-10-CM

## 2022-12-16 DIAGNOSIS — M53.3 SACROILIAC JOINT DYSFUNCTION OF RIGHT SIDE: ICD-10-CM

## 2022-12-16 NOTE — PATIENT INSTRUCTIONS
Core Strengthening Exercises   WHAT YOU NEED TO KNOW:   Your core includes the muscles of your lower back, hip, pelvis, and abdomen  Core strengthening exercises help heal and strengthen these muscles  This helps prevent another injury, and keeps your pelvis, spine, and hips in the correct position  DISCHARGE INSTRUCTIONS:   Contact your healthcare provider if:   You have sharp or worsening pain during exercise or at rest     You have questions or concerns about your shoulder exercises  Safety tips:  Talk to your healthcare provider before you start an exercise program  A physical therapist can teach you how to do core strengthening exercises safely  Do the exercises on a mat or firm surface  A firm surface will support your spine and prevent low back pain  Do not do these exercises on a bed  Move slowly and smoothly  Avoid fast or jerky motions  Stop if you feel pain  Core exercises should not be painful  Stop if you feel pain  Breathe normally during core exercises  Do not hold your breath  This may cause an increase in blood pressure and prevent muscle strengthening  Your healthcare provider will tell you when to inhale and exhale during the exercise  Begin all of your exercises with abdominal bracing  Abdominal bracing helps warm up your core muscles  You can also practice abdominal bracing throughout the day  Lie on your back with your knees bent and feet flat on the floor  Place your arms in a relaxed position beside your body  Tighten your abdominal muscles  Pull your belly button in and up toward your spine  Hold for 5 seconds  Relax your muscles  Repeat 10 times  Core strengthening exercises: Your healthcare provider will tell you how often to do these exercises  The provider will also tell you how many repetitions of each exercise you should do  Hold each exercise for 5 seconds or as directed  As you get stronger, increase your hold to 10 to 15 seconds   You can do some of these exercises on a stability ball, or with a weight  Ask your healthcare provider how to use a stability ball or weight for these exercises:  Bridging:  Lie on your back with your knees bent and feet flat on the floor  Rest your arms at your side  Tighten your buttocks, and then lift your hips 1 inch off the floor  Hold for 5 seconds  When you can do this exercise without pain for 10 seconds, increase the distance you lift your hips  A good goal is to be able to lift your hips so that your shoulders, hips, and knees are in a straight line  Dead bug:  Lie on your back with your knees bent and feet flat on the floor  Place your arms in a relaxed position beside your body  Begin with abdominal bracing  Next, raise one leg, keeping your knee bent  Hold for 5 seconds  Repeat with the other leg  When you can do this exercise without pain for 10 to 15 seconds, you may raise one straight leg and hold  Repeat with the other leg  Quadruped:  Place your hands and knees on the floor  Keep your wrists directly below your shoulders and your knees directly below your hips  Pull your belly button in toward your spine  Do not flatten or arch your back  Tighten your abdominal muscles below your belly button  Hold for 5 seconds  When you can do this exercise without pain for 10 to 15 seconds, you may extend one arm and hold  Repeat on the other side  Side bridge exercises:      Standing side bridge:  Stand next to a wall and extend one arm toward the wall  Place your palm flat on the wall with your fingers pointing upward  Begin with abdominal bracing  Next, without moving your feet, slowly bend your arm to 90 degrees  Hold for 5 seconds  Repeat on the other side  When you can do this exercise without pain for 10 to 15 seconds, you may do the bent leg side bridge on the floor  Bent leg side bridge:  Lie on one side with your legs, hips, and shoulders in a straight line   Prop yourself up onto your forearm so your elbow is directly below your shoulder  Bend your knees back to 90 degrees  Begin with abdominal bracing  Next, lift your hips and balance yourself on your forearm and knees  Hold for 5 seconds  Repeat on the other side  When you can do this exercise without pain for 10 to 15 seconds, you may do the straight leg side bridge on the floor  Straight leg side bridge:  Lie on one side with your legs, hips, and shoulders in a straight line  Prop yourself up onto your forearm so your elbow is directly below your shoulder  Begin with abdominal bracing  Lift your hips off the floor and balance yourself on your forearm and the outside of your flexed foot  Do not let your ankle bend sideways  Hold for 5 seconds  Repeat on the other side  When you can do this exercise without pain for 10 to 15 seconds, ask your healthcare provider for more advanced exercises  Superman:  Lie on your stomach  Extend your arms forward on the floor  Tighten your abdominal muscles and lift your right hand and left leg off the floor  Hold this position  Slowly return to the starting position  Tighten your abdominal muscles and lift your left hand and right leg off the floor  Hold this position  Slowly return to the starting position  Clam:  Lie on your side with your knees bent  Put your bottom arm under your head to keep your neck in line  Put your top hand on your hip to keep your pelvis from moving  Put your heels together, and keep them together during this exercise  Slowly raise your top knee toward the ceiling  Then lower your leg so your knees are together  Repeat this exercise 10 times  Then switch sides and do the exercise 10 times with the other leg  Curl up:  Lie on your back with your knees bent and feet flat on the floor  Place your hands, palms down, underneath your lower back  Next, with your elbows on the floor, lift your shoulders and chest 2 to 3 inches off the floor   Keep your head in line with your shoulders  Hold this position  Slowly return to the starting position  Straight leg raises:  Lie on your back with one leg straight  Bend the other knee and place your foot flat on the floor  Tighten your abdominal muscles  Keep your leg straight and slowly lift it straight up 6 to 12 inches off the floor  Hold this position  Lower your leg slowly  Do as many repetitions as directed on this side  Repeat with the other leg  Plank:  Lie on your stomach  Bend your elbows and place your forearms flat on the floor  Lift your chest, stomach, and knees off the floor  Make sure your elbows are below your shoulders  Your body should be in a straight line  Do not let your hips or lower back sink to the ground  Squeeze your abdominal muscles together and hold for 15 seconds  To make this exercise harder, hold for 30 seconds or lift 1 leg at a time  Bicycles:  Lie on your back  Bend both knees and bring them toward your chest  Your calves should be parallel to the floor  Place the palms of your hands on the back of your head  Straighten your right leg and keep it lifted 2 inches off the floor  Raise your head and shoulders off the floor and twist towards your left  Keep your head and shoulders lifted  Bend your right knee while you straighten your left leg  Keep your left leg 2 inches off the floor  Twist your head and chest towards the left leg  Continue to straighten 1 leg at a time and twist        Follow up with your doctor as directed:  Write down your questions so you remember to ask them during your visits  © Copyright [employer] 2022 Information is for End User's use only and may not be sold, redistributed or otherwise used for commercial purposes  All illustrations and images included in CareNotes® are the copyrighted property of [employer]  or [facility] Hemalatha Phelps   The above information is an  only   It is not intended as medical advice for individual conditions or treatments  Talk to your doctor, nurse or pharmacist before following any medical regimen to see if it is safe and effective for you

## 2022-12-16 NOTE — PROGRESS NOTES
Assessment  1  Other spondylosis, lumbar region    2  Sacroiliac joint dysfunction of right side    3  Chronic right-sided low back pain without sciatica    No significant relief of pain or improved ability to participate with IADLs after right SIJ injection  Encouraged patient to complete PT so that MRI could be ordered to better plan interventional therapies  Previously reported the following symptomatology:     Right sided axial low back pain described primarily by arthritic features  Aching, nagging, indolent, stabbing, throbbing features in axial low back without radicular components  5/5 strength bilaterally, negative SLR  Positive facet loading maneuvers in lumbar spine elicited pain, positive tenderness to palpation over lumbar paraspinal muscles  +YANE's, +GAENSLEN's, +SIJ loading right sided; +SUSAN FINGER test right sided  Findings correlate with prominent lumbar facet arthropathy seen throughout axial low back on x-ray  Currently she is neurologically intact without gait instability, saddle anesthesia or bowel/bladder abnormality  Risks, benefits alternative to SIJ injection, medial branch blocks and subsequent radiofrequency ablation of successful thoroughly discussed with patient  Handouts provided questions answered to patient satisfaction  Plan  -f/u after PT course  -encouraged patient to discuss cymbalta with pcp  -script provided for formal physical therapy for sacroiliac joint dysfunction, lumbar facet arthropathy; Physician directed home exercise plan as per [facility] demonstrated and handouts provided that patient plans to participate with for 1 hour, twice a week for the next 6 weeks  There are risks associated with opioid medications, including dependence, addiction and tolerance  The patient understands and agrees to use these medications only as prescribed   Potential side effects of the medications include, but are not limited to, constipation, drowsiness, addiction, impaired judgment and risk of fatal overdose if not taken as prescribed  The patient was warned against driving while taking sedation medications or operating heavy machinery  The patient voiced understanding  Sharing medications is a felony  At this point in time, the patient is showing no signs of addiction, abuse, diversion or suicidal ideation  [place] Prescription Drug Monitoring Program report was reviewed and was appropriate      Complete risks and benefits including bleeding, infection, tissue reaction, nerve injury and allergic reaction were discussed  The approach was demonstrated using models and literature was provided  Verbal and written consent was obtained  My impressions and treatment recommendations were discussed in detail with the patient who verbalized understanding and had no further questions  Discharge instructions were provided  I personally saw and examined the patient and I agree with the above discussed plan of care  No orders of the defined types were placed in this encounter  History of Present Illness    No significant relief of pain or improved ability to participate with IADLs after right SIJ injection  Encouraged patient to complete PT so that MRI could be ordered to better plan interventional therapies  Previously reported the following symptomatology:     Lexis Shabazz is a 79 y o  female with pmhx of obesity, depression, GERD, CKD presenting with chronic low back pain described primarily as arthritic in nature  She describes 8/10 low back pain that is worse in the mornings and worse at the end of the day  Pain is particularly worse in right low axial back  The pain is characterized by achy, nagging, indolent, crampy, stabbing pain in her axial low back  The patient describes that the pain is worse with standing for long periods of time on hard surfaces as well as with walking    The patient is a very active individual and feels as though this pain compromises her participation with independent activities of daily living  The pain can be debilitating at times and contribute to significant disability, compromising overall activity and independent activities of daily living  She has not yet trialed formal PT  Medications the patient has tried in the past include celexa, OTC tylenol  She describes no radicular symptoms and has good strength  She denies any weakness numbness or paresthesias  The patient denies any bowel or bladder dysfunction as well, saddle anesthesia or gait instability  I have personally reviewed and/or updated the patient's past medical history, past surgical history, family history, social history, current medications, allergies, and vital signs today  Review of Systems   Constitutional: Positive for activity change  HENT: Negative  Eyes: Negative  Respiratory: Negative  Cardiovascular: Negative  Gastrointestinal: Negative  Endocrine: Negative  Genitourinary: Negative  Musculoskeletal: Positive for arthralgias, back pain, gait problem and myalgias  Skin: Negative  Allergic/Immunologic: Negative  Neurological: Negative for weakness and numbness  Hematological: Negative  Psychiatric/Behavioral: Negative  All other systems reviewed and are negative        Patient Active Problem List   Diagnosis   • Chronic pain of right knee   • Chronic right shoulder pain   • Adhesive capsulitis of right shoulder   • Primary osteoarthritis of right knee   • Class 3 severe obesity due to excess calories with serious comorbidity and body mass index (BMI) of 40 0 to 44 9 in adult [facility])   • Chronic right-sided low back pain without sciatica   • Other spondylosis, lumbar region   • Current mild episode of major depressive disorder without prior episode ([address] )   • Chronic kidney disease, stage 3a ([facility] [address] )   • Sacroiliac joint dysfunction of right side       Past Medical History:   Diagnosis Date   • Anxiety    • Arthritis     knees and feet   • Depression    • GERD (gastroesophageal reflux disease)    • Hypertension    • Sciatica    • Stomach ulcer        Past Surgical History:   Procedure Laterality Date   • APPENDECTOMY     • CHOLECYSTECTOMY     • GASTRIC BYPASS     • HERNIA REPAIR     • MT INJECTION,SACROILIAC JOINT Right 2022    Procedure: BLOCK / INJECTION SACROILIAC;  Surgeon: Davis Deshpande MD;  [facility];  Service: Pain Management        Family History   Problem Relation Age of Onset   • Hypertension Mother    • Heart attack Mother    • Stroke Mother    • Hypertension Father    • Heart attack Father    • No Known Problems Maternal Grandmother    • No Known Problems Maternal Grandfather    • No Known Problems Paternal Grandmother    • No Known Problems Paternal Grandfather    • Cancer Maternal Aunt         bladder cancer   • No Known Problems Paternal Aunt    • No Known Problems Paternal Aunt        Social History     Occupational History   • Not on file   Tobacco Use   • Smoking status: Never   • Smokeless tobacco: Never   Vaping Use   • Vaping Use: Never used   Substance and Sexual Activity   • Alcohol use: Yes     Comment: rarely   • Drug use: Never   • Sexual activity: Not on file       Current Outpatient Medications on File Prior to Visit   Medication Sig   • citalopram (CeleXA) 20 mg tablet Take 1 tablet by mouth 2 (two) times a day   • Cyanocobalamin (VITAMIN B-12 PO) Take by mouth   • Diclofenac Sodium (VOLTAREN) 1 % Apply 2 g topically 4 (four) times a day   • IRON-VITAMIN C PO Take by mouth   • losartan-hydrochlorothiazide (HYZAAR) 50-12 5 mg per tablet Take 1 tablet by mouth in the morning   • Multiple Minerals-Vitamins (CALCIUM CITRATE PLUS PO) Take by mouth   • pantoprazole (PROTONIX) 40 mg tablet Take 1 tablet by mouth daily   • torsemide (DEMADEX) 5 MG tablet prn   • VITAMIN D PO Take by mouth   • [] benzonatate (TESSALON) 200 MG capsule Take 1 capsule (200 mg total) by mouth 3 (three) times a day as needed for cough for up to 14 days     No current facility-administered medications on file prior to visit  Allergies   Allergen Reactions   • Medical Tape Rash     Silicones, Other reaction(s): Erythema     • Wound Dressing Adhesive Rash     Tape adhesive causes a reaction         Physical Exam    /72   Pulse 89   Temp 98 °F (36 7 °C)   Resp 20   Ht 5' 9" (1 753 m)   Wt 134 kg (294 lb 6 4 oz)   BMI 43 48 kg/m²     Constitutional: normal, well developed, well nourished, alert, in no distress and non-toxic and no overt pain behavior  and obese  Eyes: anicteric  HEENT: grossly intact  Neck: supple, symmetric, trachea midline and no masses   Pulmonary:even and unlabored  Cardiovascular:No edema or pitting edema present  Skin:Normal without rashes or lesions and well hydrated  Psychiatric:Mood and affect appropriate  Neurologic:Cranial Nerves II-XII grossly intact Sensation grossly intact; no clonus negative nunez's  Reflexes 2+ and brisk  SLR negative bilaterally  Musculoskeletal: antalgic gait, ambulates with cane  5/5 strength bilaterally with AROM in all extremities  Unable to perform normal heel toe and tip toe walking  Significant pain with lumbar facet loading bilaterally and with lateral spine rotation  ttp over lumbar paraspinal muscles  Positive right sided kassy's test, gaenslen's, SIJ loading right sided  Imaging    LUMBAR SPINE     INDICATION:   M54 50: Low back pain, unspecified  G89 29:  Other chronic pain      COMPARISON:  None     VIEWS:  XR SPINE LUMBAR 2 OR 3 VIEWS INJURY        FINDINGS:     There are 5 non rib bearing lumbar vertebral bodies      No evidence of acute fracture      Schmorl's node at T11      Degenerative spondylosis is seen in multiple levels disc space narrowing most pronounced at L4-5 and L5-S1      Grade 1 retrolisthesis of L3 as compared to L4 and L4 as compared to L5      Facet hypertrophy is most pronounced at L5-S1 with sclerosis       No significant lumbar degenerative change noted      The pedicles appear intact  Surgical clips are noted in the right upper quadrant        IMPRESSION:     Degenerative spondylosis as above    No compression deformity         Workstation performed: [id number]

## 2023-01-05 ENCOUNTER — EVALUATION (OUTPATIENT)
Dept: PHYSICAL THERAPY | Facility: CLINIC | Age: 68
End: 2023-01-05

## 2023-01-05 DIAGNOSIS — M53.3 SACROILIAC JOINT PAIN: ICD-10-CM

## 2023-01-05 DIAGNOSIS — M47.816 LUMBAR SPONDYLOSIS: Primary | ICD-10-CM

## 2023-01-05 NOTE — PROGRESS NOTES
PT Evaluation     Today's date: 2023  Patient name: Karthik Gupta  : 1955  MRN: [id number]  Referring provider: Luanne Lafleur MD  Dx:   Encounter Diagnosis     ICD-10-CM    1  Lumbar spondylosis  M47 816       2  Sacroiliac joint pain  M53 3           Start Time: 1440  Stop Time: 1530  Total time in clinic (min): 50 minutes    Assessment  Assessment details: Pt presents to PT with exacerbation of chronic Hx of LBP  She hsa not been seen since mid 2022 and noted she needs to do PT to get MRI  Functional limitations include standing and walking limitation due to pain  Painful transfers  Significant findings from examination include: decreased/painful Lumbar ROM, (-) neurodynamic testing, R knee hypomobility, and decreased spinal stabilizer mm activation  Symptoms appear flexion tolerant  She was having flare-ups with initial basic PT  Will focus on painfree flexion and spinal mobility with progression towards trunk, hip, leg strength as appropriate  Skilled PT warranted to address findings and progress towards outlined goals  Pt in agreement with current POC  Impairments: abnormal gait, abnormal or restricted ROM, activity intolerance, impaired physical strength and lacks appropriate home exercise program    Symptom irritability: moderateUnderstanding of Dx/Px/POC: good   Prognosis: good    Goals  Short Term Functional Goals - not met  In 4 weeks patient will:   Pt will report 3/10 Lumbar pain on VAS to allow work tasks at desk    Improve functional mobility: Increased sitting and standing tolerance to 30 and 10 minutes respectively before changing position    Patient to be independent with HEP to maximize improvement in functional mobility    pt will increase FOTO score by 10pts to reflect a statistically significant improvement     ?   Long Term Functional Goals:    In 6 weeks patient will:   -Pt will report 2/10 Lumbar pain on VAS to allow home and self-care tasks    -Patient will demonstrate WFL pain-free Lumbar motion to better complete ADLs     -Improve functional mobility:  Patient will be able to walk and establish exercise program    -The patient will go for 30 minutes walks 3-4 times per week and stretch daily to improve functional mobility and activity tolerance    -Pt will score at or above predicted discharge FOTO score of 54 to reflect improvement in subjective functional ability  Plan  Plan details: TE, NMR, TA, MT, self education, and modalities as needed in order to progress through skilled PT focused on  ROM, strength, balance, coordination   Patient would benefit from: skilled physical therapy  Referral necessary: No  Planned modality interventions: cryotherapy and thermotherapy: hydrocollator packs  Planned therapy interventions: manual therapy, neuromuscular re-education, patient education, therapeutic activities, therapeutic exercise and home exercise program  Frequency: 2x week  Duration in weeks: 6  Plan of Care beginning date: 2023  Plan of Care expiration date: 2023  Treatment plan discussed with: patient        Subjective Evaluation    History of Present Illness  Date of onset: 10/1/2022  Mechanism of injury: Ana Frias reports Chronic Hx of lumbar pain    no recent reports of groin pain  Symptoms aggravated by standing, walking, doing house work    Symptoms improved sitting, resting  Seems to be mostly in back, has hx of sciatic type pain but right now that seems under control most of the time  Denies pain into legs  Had injection at right SIJ around Thanksgiving  Did not help at all  Notes she is doing PT in order to get an MRI              Not a recurrent problem   Quality of life: good    Pain  Current pain rating: 3  At best pain ratin  At worst pain ratin  Relieving factors: ice  Progression: worsening    Social Support  Steps to enter house: yes  6  Stairs in house: no   L[facility] in: [facility]  Lives with: spouse    Employment status: working  Treatments  Current treatment: injection treatment  Patient Goals  Patient goals for therapy: decreased pain, increased strength, independence with ADLs/IADLs and increased motion          Objective     Concurrent Complaints  Negative for night pain, disturbed sleep, bladder dysfunction, bowel dysfunction and saddle (S4) numbness    Neurological Testing     Reflexes   Left   Patellar (L4): normal (2+)  Achilles (S1): normal (2+)    Right   Patellar (L4): normal (2+)  Achilles (S1): normal (2+)    Active Range of Motion     Lumbar   Flexion:  Restriction level: minimal  Extension:  Restriction level: maximal  Left lateral flexion:  Restriction level: moderate  Right lateral flexion:  Restriction level: moderate  Left rotation:  Restriction level: moderate  Right rotation:  Restriction level: moderate  Left Knee   Flexion: 130 degrees   Extension: -5 degrees     Right Knee   Flexion: 125 degrees   Extension: -20 degrees     Additional Active Range of Motion Details  Good hip motion     Strength/Myotome Testing     Lumbar   Left   Normal strength    Right Hip   Planes of Motion   Flexion: WFL  Extension: WFL    Right Knee   Flexion: 4+  Extension: WFL    Right Ankle/Foot   Dorsiflexion: WFL  Plantar flexion: WFL  Inversion: WFL  Eversion: WFL       Tests     Lumbar     Left   Negative passive SLR and slump test      Right   Negative passive SLR and slump test               Precautions: none     Daily Treatment Diary:           Initial Evaluation Date: 11/14/22  Compliance 11/14 11/17 11/21 1/5               Visit Number 1 2  3  4               Re-Eval  IE                 [facility]   Carolyn [employer] Captured y                                11/14 11/17 11/21 1/5               Manual                       L/S STM                       Piriformis TpR                       LAD                       L/S Mobs                                assessment               Ther-Ex                       Active Warm-up                       LTR x10  x10  x20  x20  x20               SKTC        10x10" ea               Hooklying ADD x10 5"  2x10 5" ea  2x10 5" ea  2x10 5" ea               Piriformis Stretch                       Clamshells    Grn 2x10 5" ea  Grn 2x10 5" ea  supine h/l rtb 3x10               90/90 stretch                       Supine Bridge 10x5"  2x10 5"  Grn 2x10 5"                  Hooklying ABD   Alberto 2x10 5"  Alberto 2x10 5"                 Standing Hip ABD/Ext    2x10 ea (B)  2x10 ea (B)                  education       Log roll work for transfer to supine and sit               Neuro Re-Ed                       TrA brace c Uni march 10x5"  10x5"  10x5"                 BKFO                       TrA Ball Press 3-way   Straight only 2x10 5" ea    Straight only 2x10 5" eaz                                                                 Ther-Act                                                                                                Modalities                       IFC with [facility]                                                                          Access Code: [email or web address]  URL: [email or web address]  Date: 01/05/2023  Prepared by: Deeanna Mortimer    Exercises  • Hooklying Single Knee to Chest Stretch - 1 x daily - 7 x weekly - 3 sets - 10 reps  • Supine Lower Trunk Rotation - 1 x daily - 7 x weekly - 3 sets - 10 reps  • Supine Hip Adduction Isometric with Ball - 1 x daily - 7 x weekly - 3 sets - 10 reps  • Hooklying Clamshell with Resistance - 1 x daily - 7 x weekly - 3 sets - 10 reps

## 2023-01-09 ENCOUNTER — OFFICE VISIT (OUTPATIENT)
Dept: PHYSICAL THERAPY | Facility: CLINIC | Age: 68
End: 2023-01-09

## 2023-01-09 DIAGNOSIS — M53.3 SACROILIAC JOINT PAIN: ICD-10-CM

## 2023-01-09 DIAGNOSIS — M47.816 LUMBAR SPONDYLOSIS: Primary | ICD-10-CM

## 2023-01-09 NOTE — PROGRESS NOTES
Daily Note     Today's date: 2023  Patient name: Ellen Ortega  : 1955  MRN: [id number]  Referring provider: Myesha Anderson MD  Dx:   Encounter Diagnosis     ICD-10-CM    1  Lumbar spondylosis  M47 816       2  Sacroiliac joint pain  M53 3                      Subjective: Patient reports she sleeps better if she does her exercises before bed  Objective: See treatment diary below      Assessment: Ellen Ortega appeared to tolerate treatment with focus on core recruitment well  Improved execution of TrA iso this visit  She should have decrease in symptoms with functional core recruitment  Continued skilled therapy indicated  Plan: Continue per plan of care        Precautions: none     Daily Treatment Diary:           Initial Evaluation Date: 22  Compliance              Visit Number 1 2  3  4  5             Re-Eval  IE                 [facility]   Foto Captured y                                             Manual                       L/S STM                       Piriformis TpR                       LAD                       L/S Mobs                                assessment               Ther-Ex                       Active Warm-up          SRC 8'             LTR x10  x10  x20  x20  x20  5"x20 ea             SKTC        10x10" ea  10x10" ea             Hooklying ADD x10 5"  2x10 5" ea  2x10 5" ea  2x10 5" ea 2x10 5" ea             Piriformis Stretch         30"x4             Clamshells    Grn 2x10 5" ea  Grn 2x10 5" ea  supine h/l rtb 3x10 2x10 h/l gtb  5"x20             90/90 stretch                       Supine Bridge 10x5"  2x10 5"  Grn 2x10 5"     2x10             Hooklying ABD   Alberto 2x10 5"  Alberto 2x10 5"                 Standing Hip ABD/Ext    2x10 ea (B)  2x10 ea (B)                  education       Log roll work for transfer to supine and sit               Neuro Re-Ed                       TrA brace c Uni march 10x5"  10x5"  10x5"    2x10             TrA          5"x20             TrA Ball Press 3-way   Straight only 2x10 5" ea    Straight only 2x10 5" eaz    Straight only 3x10 5" eaz                                                             Ther-Act                                                                                                Modalities                       IFC with [facility]                                                                          Access Code: [email or web address]  URL: [email or web address]  Date: 01/05/2023  Prepared by: Dashawn Poole    Exercises  • Hooklying Single Knee to Chest Stretch - 1 x daily - 7 x weekly - 3 sets - 10 reps  • Supine Lower Trunk Rotation - 1 x daily - 7 x weekly - 3 sets - 10 reps  • Supine Hip Adduction Isometric with Ball - 1 x daily - 7 x weekly - 3 sets - 10 reps  • Hooklying Clamshell with Resistance - 1 x daily - 7 x weekly - 3 sets - 10 reps

## 2023-01-12 ENCOUNTER — OFFICE VISIT (OUTPATIENT)
Dept: PHYSICAL THERAPY | Facility: CLINIC | Age: 68
End: 2023-01-12

## 2023-01-12 DIAGNOSIS — M47.816 LUMBAR SPONDYLOSIS: Primary | ICD-10-CM

## 2023-01-12 DIAGNOSIS — M53.3 SACROILIAC JOINT PAIN: ICD-10-CM

## 2023-01-12 NOTE — PROGRESS NOTES
Daily Note     Today's date: 2023  Patient name: Guido Celestin  : 1955  MRN: [id number]  Referring provider: Monae Jackson MD  Dx:   Encounter Diagnosis     ICD-10-CM    1  Lumbar spondylosis  M47 816       2  Sacroiliac joint pain  M53 3                      Subjective: Patient reports feeling better after each visit  Objective: See treatment diary below      Assessment: Guido Celestin appears to gaining good relief from treatment  Lumbar mobility appears to be improved  Continued treatment should benefit  Plan: Continue per plan of care        Precautions: none     Daily Treatment Diary:           Initial Evaluation Date: 22  Compliance            Visit Number 1 2  3  4  5  6           Re-Eval  IE                 [facility]   Foto Captured y                                           Manual                       L/S STM                       Piriformis TpR                       LAD                       L/S Mobs                                assessment               Ther-Ex                       Active Warm-up          SRC 8' [address] 8'           LTR x10  x10  x20  x20  x20  5"x20 ea  5"x20 ea           SKTC        10x10" ea  10x10" ea  10x10" ea           Hooklying ADD x10 5"  2x10 5" ea  2x10 5" ea  2x10 5" ea 2x10 5" ea  2x10 5" ea           Piriformis Stretch         30"x4 30"x4 ea           Clamshells    Grn 2x10 5" ea  Grn 2x10 5" ea  supine h/l rtb 3x10 2x10 h/l gtb  5"x20  2x10 h/l gtb  5"x20           90/90 stretch            10"x10           Supine Bridge 10x5"  2x10 5"  Grn 2x10 5"     2x10  2x10           Hooklying ABD   Alberto 2x10 5"  Alberto 2x10 5"      Alberto 2x10 5"           Standing Hip ABD/Ext    2x10 ea (B)  2x10 ea (B)                  education       Log roll work for transfer to supine and sit               Neuro Re-Ed                       TrA brace c Uni march 10x5"  10x5"  10x5"    2x10  2x10           TrA          5"x20  5"x20           TrA Ball Press 3-way   Straight only 2x10 5" ea    Straight only 2x10 5" eaz    Straight only 3x10 5"  Straight only 3x10 5"                                                            Ther-Act                                                                                                Modalities                       IFC with [facility]                                                                          Access Code: [email or web address]  URL: [email or web address]  Date: 01/05/2023  Prepared by: Damaris Osborn    Exercises  • Hooklying Single Knee to Chest Stretch - 1 x daily - 7 x weekly - 3 sets - 10 reps  • Supine Lower Trunk Rotation - 1 x daily - 7 x weekly - 3 sets - 10 reps  • Supine Hip Adduction Isometric with Ball - 1 x daily - 7 x weekly - 3 sets - 10 reps  • Hooklying Clamshell with Resistance - 1 x daily - 7 x weekly - 3 sets - 10 reps

## 2023-01-16 ENCOUNTER — OFFICE VISIT (OUTPATIENT)
Dept: PHYSICAL THERAPY | Facility: CLINIC | Age: 68
End: 2023-01-16

## 2023-01-16 DIAGNOSIS — M47.816 LUMBAR SPONDYLOSIS: Primary | ICD-10-CM

## 2023-01-16 DIAGNOSIS — M53.3 SACROILIAC JOINT PAIN: ICD-10-CM

## 2023-01-16 NOTE — PROGRESS NOTES
Daily Note     Today's date: 2023  Patient name: Nuzhat Coombs  : 1955  MRN: [id number]  Referring provider: Precious Zarate MD  Dx:   Encounter Diagnosis     ICD-10-CM    1  Lumbar spondylosis  M47 816       2  Sacroiliac joint pain  M53 3                      Subjective: Patient reports she is noting less discomfort when sitting at her desk at work  Objective: See treatment diary below      Assessment: Nuzhat Coombs continues with steady  progress towards core stability goals  Patient core recruitment appears to be improved  she required moderate verbal cueing to complete exercises appropriate form and intensity with no tactile cues  Patient low back symptoms should decrease with continued treatments  Plan: Continue per plan of care        Precautions: none     Daily Treatment Diary:           Initial Evaluation Date: 22  Compliance          Visit Number 1 2  3  4  5  6  7         Re-Eval  IE                 [facility]   Foto Captured y                                         Manual                       L/S STM                       Piriformis TpR                       LAD                       L/S Mobs                                assessment               Ther-Ex                       Active Warm-up          SRC 8' [address]' [address] 8'         LTR x10  x10  x20  x20  x20  5"x20 ea  5"x20 ea  5"x20 ea         SKTC        10x10" ea  10x10" ea  10x10" ea  10x10" ea         Hooklying ADD x10 5"  2x10 5" ea  2x10 5" ea  2x10 5" ea 2x10 5" ea  2x10 5" ea  2x10 5" ea         Piriformis Stretch         30"x4 30"x4 ea  30"x4 ea         Clamshells    Grn 2x10 5" ea  Grn 2x10 5" ea  supine h/l rtb 3x10 2x10 h/l gtb  5"x20  2x10 h/l gtb  5"x20  2x10 h/l btb  5"x20         90/90 stretch            10"x10  10"x10         Supine Bridge 10x5"  2x10 5"  Grn 2x10 5"     2x10  2x10  2x10         Hooklying ABD   Alberto 2x10 5"  Alberto 2x10 5"                 Standing Hip ABD/Ext    2x10 ea (B)  2x10 ea (B)                  education       Log roll work for transfer to supine and sit               Neuro Re-Ed                       TrA brace c Uni march 10x5"  10x5"  10x5"    2x10  2x10 x30         TrA          5"x20  5"x20  5"x20         TrA Ball Press 3-way   Straight only 2x10 5" ea    Straight only 2x10 5" eaz    Straight only 3x10 5"  Straight only 3x10 5"   Straight only 3x10 5"                                                          Ther-Act                                                                                                Modalities                       IFC with [facility]                                                                          Access Code: [email or web address]  URL: [email or web address]  Date: 01/05/2023  Prepared by: Cristian Reyes    Exercises  • Hooklying Single Knee to Chest Stretch - 1 x daily - 7 x weekly - 3 sets - 10 reps  • Supine Lower Trunk Rotation - 1 x daily - 7 x weekly - 3 sets - 10 reps  • Supine Hip Adduction Isometric with Ball - 1 x daily - 7 x weekly - 3 sets - 10 reps  • Hooklying Clamshell with Resistance - 1 x daily - 7 x weekly - 3 sets - 10 reps

## 2023-01-19 ENCOUNTER — OFFICE VISIT (OUTPATIENT)
Dept: PHYSICAL THERAPY | Facility: CLINIC | Age: 68
End: 2023-01-19

## 2023-01-19 DIAGNOSIS — M47.816 LUMBAR SPONDYLOSIS: Primary | ICD-10-CM

## 2023-01-19 DIAGNOSIS — M53.3 SACROILIAC JOINT PAIN: ICD-10-CM

## 2023-01-19 NOTE — PROGRESS NOTES
Daily Note     Today's date: 2023  Patient name: Kristel Goode  : 1955  MRN: [id number]  Referring provider: Micah Lott MD  Dx:   Encounter Diagnosis     ICD-10-CM    1  Lumbar spondylosis  M47 816       2  Sacroiliac joint pain  M53 3                      Subjective: does not have to use ice/heat while sitting at work nearly as much now      Objective: See treatment diary below      Assessment: added standing trunk work today for progression  Tolerated well from a low back but has some discomfort with knee        Plan: continue with trunk work     Precautions: none     Daily Treatment Diary:           Initial Evaluation Date: 22  Compliance        Visit Number 1 2  3  4  5  6  7  8       Re-Eval  [facility]   Foto Captured y                                       Manual                       L/S STM                       Piriformis TpR                       LAD                       L/S Mobs                                assessment               Ther-Ex                       Active Warm-up          SRC 8' [address] 8' [address] 8' [address] 8'       LTR x10  x10  x20  x20  x20  5"x20 ea  5"x20 ea  5"x20 ea  5"x20       SKTC        10x10" ea  10x10" ea  10x10" ea  10x10" ea  10x10" ea       Hooklying ADD x10 5"  2x10 5" ea  2x10 5" ea  2x10 5" ea 2x10 5" ea  2x10 5" ea  2x10 5" ea  2x10 5" ea       Piriformis Stretch         30"x4 30"x4 ea  30"x4 ea  30"x4       Clamshells    Grn 2x10 5" ea  Grn 2x10 5" ea  supine h/l rtb 3x10 2x10 h/l gtb  5"x20  2x10 h/l gtb  5"x20  2x10 h/l btb  5"x20  btb 3x10       90/90 stretch            10"x10  10"x10  10"x10       Supine Bridge 10x5"  2x10 5"  Grn 2x10 5"     2x10  2x10  2x10  2x10       Hooklying ABD   Alberto 2x10 5"  Alberto 2x10 5"                 Paloff press        rtb2 x15 ea     SA LPD        rtb x20     Standing Hip ABD/Ext    2x10 ea (B)  2x10 ea (B)                  education       Log roll work for transfer to supine and sit               Neuro Re-Ed                       TrA brace c Uni march 10x5"  10x5"  10x5"    2x10  2x10 x30  x30       TrA          5"x20  5"x20  5"x20  5"x20       TrA Ball Press 3-way   Straight only 2x10 5" ea    Straight only 2x10 5" eaz    Straight only 3x10 5"  Straight only 3x10 5"   Straight only 3x10 5"                                                          Ther-Act                                                                                                Modalities                       IFC with [facility]                                                                          Access Code: [email or web address]  URL: [email or web address]  Date: 01/05/2023  Prepared by: Sunday Caceres    Exercises  • Hooklying Single Knee to Chest Stretch - 1 x daily - 7 x weekly - 3 sets - 10 reps  • Supine Lower Trunk Rotation - 1 x daily - 7 x weekly - 3 sets - 10 reps  • Supine Hip Adduction Isometric with Ball - 1 x daily - 7 x weekly - 3 sets - 10 reps  • Hooklying Clamshell with Resistance - 1 x daily - 7 x weekly - 3 sets - 10 reps

## 2023-01-23 ENCOUNTER — OFFICE VISIT (OUTPATIENT)
Dept: PHYSICAL THERAPY | Facility: CLINIC | Age: 68
End: 2023-01-23

## 2023-01-23 DIAGNOSIS — M47.816 LUMBAR SPONDYLOSIS: Primary | ICD-10-CM

## 2023-01-23 DIAGNOSIS — M53.3 SACROILIAC JOINT PAIN: ICD-10-CM

## 2023-01-23 NOTE — PROGRESS NOTES
Daily Note     Today's date: 2023  Patient name: Kristel Goode  : 1955  MRN: [id number]  Referring provider: Micah Lott MD  Dx:   Encounter Diagnosis     ICD-10-CM    1  Lumbar spondylosis  M47 816       2  Sacroiliac joint pain  M53 3                      Subjective: Patient reports anterior knee area pain  Objective: See treatment diary below      Assessment: Kristel Goode appeared to have some relief from femoral nerve glide  Improving core stability noted  Continued treatment should benefit  Plan: Continue per plan of care        Precautions: none     Daily Treatment Diary:           Initial Evaluation Date: 22  Compliance      Visit Number 1 2  3  4  5  6  7  8  9     Re-Eval  [facility]   Foto Captured y                                     Manual                       L/S STM                       Piriformis TpR                       LAD                       L/S Mobs                                assessment               Ther-Ex                       Active Warm-up          SRC 8' [address] 8' [address] 8' [address] 8'  nu step 10'     LTR x10  x10  x20  x20  x20  5"x20 ea  5"x20 ea  5"x20 ea  5"x20  5"x20     SKTC        10x10" ea  10x10" ea  10x10" ea  10x10" ea  10x10" ea  10x10" ea     Hooklying ADD x10 5"  2x10 5" ea  2x10 5" ea  2x10 5" ea 2x10 5" ea  2x10 5" ea  2x10 5" ea  2x10 5" ea 2x10 5" ea     Piriformis Stretch         30"x4 30"x4 ea  30"x4 ea  30"x4  30"x4     Clamshells    Grn 2x10 5" ea  Grn 2x10 5" ea  supine h/l rtb 3x10 2x10 h/l gtb  5"x20  2x10 h/l gtb  5"x20  2x10 h/l btb  5"x20  btb 3x10  btb 3x10     90/90 stretch            10"x10  10"x10  10"x10  10"x10     Supine Bridge 10x5"  2x10 5"  Grn 2x10 5"     2x10  2x10  2x10  2x10   2x10     Hooklying ABD   Alberto 2x10 5"  Alberto 2x10 5"                 Paloff press        rtb2 x15 ea nv    SA LPD        rtb x20 nv    Standing Hip ABD/Ext    2x10 ea (B)  2x10 ea (B)                  education       Log roll work for transfer to supine and sit               Neuro Re-Ed                       TrA brace c Uni march 10x5"  10x5"  10x5"    2x10  2x10 x30  x30  x30     TrA          5"x20  5"x20  5"x20  5"x20  5"x20     TrA Ball Press 3-way   Straight only 2x10 5" ea    Straight only 2x10 5" eaz    Straight only 3x10 5"  Straight only 3x10 5"   Straight only 3x10 5"      Straight only 10"x20                                                      Ther-Act                                                                                                Modalities                       [facility] with [facility]                                                                          Access Code: [email or web address]  URL: [email or web address]  Date: 01/05/2023  Prepared by: Garrett Herrera    Exercises  • Hooklying Single Knee to Chest Stretch - 1 x daily - 7 x weekly - 3 sets - 10 reps  • Supine Lower Trunk Rotation - 1 x daily - 7 x weekly - 3 sets - 10 reps  • Supine Hip Adduction Isometric with Ball - 1 x daily - 7 x weekly - 3 sets - 10 reps  • Hooklying Clamshell with Resistance - 1 x daily - 7 x weekly - 3 sets - 10 reps

## 2023-01-26 ENCOUNTER — APPOINTMENT (OUTPATIENT)
Dept: PHYSICAL THERAPY | Facility: CLINIC | Age: 68
End: 2023-01-26

## 2023-01-30 ENCOUNTER — OFFICE VISIT (OUTPATIENT)
Dept: PHYSICAL THERAPY | Facility: CLINIC | Age: 68
End: 2023-01-30

## 2023-01-30 DIAGNOSIS — M47.816 LUMBAR SPONDYLOSIS: Primary | ICD-10-CM

## 2023-01-30 DIAGNOSIS — M53.3 SACROILIAC JOINT PAIN: ICD-10-CM

## 2023-01-30 NOTE — PROGRESS NOTES
Daily Note     Today's date: 2023  Patient name: Marley Christopher  : 1955  MRN: [id number]  Referring provider: Sabine Benitez MD  Dx:   Encounter Diagnosis     ICD-10-CM    1  Lumbar spondylosis  M47 816       2  Sacroiliac joint pain  M53 3             Subjective: Patient reports improved sleep quality, when completing HEP before bedtime  Objective: See treatment diary below      Assessment: Tolerated treatment well  Favorable response to self stretching  Core stability tasks were challenging today, loss of brace contraction noted with fatigue  Overall, improved mobility noted post session  Progress as able  Plan: Continue per plan of care         Precautions: none     Daily Treatment Diary:           Initial Evaluation Date: 22  Compliance    Visit Number 1 2  3  4  5  6  7  8  9  10   Re-Eval  [facility]   Foto Captured y                                   Manual                       L/S STM                       Piriformis TpR                       LAD                       L/S Mobs                                assessment               Ther-Ex                       Active Warm-up          SRC 8' [address] 8' [address] 8' [address] 8'  nu step 10'  nu step 10'   LTR x10  x10  x20  x20  x20  5"x20 ea  5"x20 ea  5"x20 ea  5"x20  5"x20   5"x20   SKTC        10x10" ea  10x10" ea  10x10" ea  10x10" ea  10x10" ea  10x10" ea  10x10" ea   Hooklying ADD x10 5"  2x10 5" ea  2x10 5" ea  2x10 5" ea 2x10 5" ea  2x10 5" ea  2x10 5" ea  2x10 5" ea 2x10 5" ea 2x10 5" ea   Piriformis Stretch         30"x4 30"x4 ea  30"x4 ea  30"x4  30"x4  30"x4   Clamshells    Grn 2x10 5" ea  Grn 2x10 5" ea  supine h/l rtb 3x10 2x10 h/l gtb  5"x20  2x10 h/l gtb  5"x20  2x10 h/l btb  5"x20  btb 3x10  btb 3x10  2x10 h/l btb  5"x20   90/90 stretch            10"x10  10"x10  10"x10  10"x10  10x10"   Supine Bridge 10x5"  2x10 5"  Grn 2x10 5"     2x10  2x10  2x10  2x10   2x10  2x10   Hooklying ABD   Alberto 2x10 5"  Alberto 2x10 5"                 Paloff press        rtb2 x15 ea nv    SA LPD        rtb x20 nv    Standing Hip ABD/Ext    2x10 ea (B)  2x10 ea (B)                  education       Log roll work for transfer to supine and sit               Neuro Re-Ed                       TrA brace c Uni march 10x5"  10x5"  10x5"    2x10  2x10 x30  x30  x30  x30   TrA          5"x20  5"x20  5"x20  5"x20  5"x20     TrA Ball Press 3-way   Straight only 2x10 5" ea    Straight only 2x10 5" eaz    Straight only 3x10 5"  Straight only 3x10 5"   Straight only 3x10 5"      Straight only 10"x20   Straight only 10"x20                                                    Ther-Act                                                                                                Modalities                       IFC with [facility]                                                                          Access Code: [email or web address]  URL: [email or web address]  Date: 01/05/2023  Prepared by: Curt Koyanagi    Exercises  • Hooklying Single Knee to Chest Stretch - 1 x daily - 7 x weekly - 3 sets - 10 reps  • Supine Lower Trunk Rotation - 1 x daily - 7 x weekly - 3 sets - 10 reps  • Supine Hip Adduction Isometric with Ball - 1 x daily - 7 x weekly - 3 sets - 10 reps  • Hooklying Clamshell with Resistance - 1 x daily - 7 x weekly - 3 sets - 10 reps

## 2023-02-02 ENCOUNTER — OFFICE VISIT (OUTPATIENT)
Dept: PHYSICAL THERAPY | Facility: CLINIC | Age: 68
End: 2023-02-02

## 2023-02-02 DIAGNOSIS — M53.3 SACROILIAC JOINT PAIN: ICD-10-CM

## 2023-02-02 DIAGNOSIS — M47.816 LUMBAR SPONDYLOSIS: Primary | ICD-10-CM

## 2023-02-02 NOTE — PROGRESS NOTES
Daily Note     Today's date: 2023  Patient name: Kush Galan  : 1955  MRN: [id number]  Referring provider: Jerome Gar MD  Dx:   Encounter Diagnosis     ICD-10-CM    1  Lumbar spondylosis  M47 816       2  Sacroiliac joint pain  M53 3                      Subjective: back is feeling pretty good today  Since starting feels she can walk better, feels more limber with movement, and if does HEP right before bed - it helps her sleep better  CC is medial knee pain      Objective: See treatment diary below      Assessment:  Required cueing for proper TA+march and hand position with ball press in hooklying  Good subjective changes with PT  Tenderness through pes anserine  Advised to try icing at home        Plan: re-eval next visit     Precautions: none     Daily Treatment Diary:           Initial Evaluation Date: 22  Compliance    Visit Number 11     5  6  7  8  9  10   Re-Eval       RE             Foto Captured                                 Manual               L/S STM               Piriformis TpR               LAD               L/S Mobs                               Ther-Ex               Active Warm-up NS 10'         nu step 10'  nu step 10'   LTR 5"x20         5"x20   5"x20   SKTC 10x10"         10x10" ea  10x10" ea   Hooklying ADD 2x10 5"        2x10 5" ea 2x10 5" ea   Piriformis Stretch 30"x4         30"x4  30"x4   Clamshells btb 3x12         btb 3x10  2x10 h/l btb  5"x20   90/90 stretch 10x10"         10"x10  10x10"   Supine Bridge 2x10          2x10  2x10   Hooklying ABD               Paloff press gtb 2x10        nv    SA LPD bck 2x10        nv    Standing Hip ABD/Ext                education               Neuro Re-Ed               TrA brace c Uni march x10         x30  x30   TrA x10 5"         5"x20     TrA Ball Press 3-way x15 reg, x10 single ea          Straight only 10"x20   Straight only 10"x20                            Ther-Act                                                                Modalities               IFC with P                                                          Access Code: [email or web address]  URL: [email or web address]  Date: 01/05/2023  Prepared by: Garrett Herrera    Exercises  • Hooklying Single Knee to Chest Stretch - 1 x daily - 7 x weekly - 3 sets - 10 reps  • Supine Lower Trunk Rotation - 1 x daily - 7 x weekly - 3 sets - 10 reps  • Supine Hip Adduction Isometric with Ball - 1 x daily - 7 x weekly - 3 sets - 10 reps  • Hooklying Clamshell with Resistance - 1 x daily - 7 x weekly - 3 sets - 10 reps

## 2023-02-06 ENCOUNTER — EVALUATION (OUTPATIENT)
Dept: PHYSICAL THERAPY | Facility: CLINIC | Age: 68
End: 2023-02-06

## 2023-02-06 DIAGNOSIS — M47.816 LUMBAR SPONDYLOSIS: Primary | ICD-10-CM

## 2023-02-06 DIAGNOSIS — M53.3 SACROILIAC JOINT PAIN: ICD-10-CM

## 2023-02-06 NOTE — PROGRESS NOTES
PT Re-Evaluation     Today's date: 2023  Patient name: Dorethea Cogan  : 1955  MRN: [id number]  Referring provider: Joelle Orta MD  Dx:   Encounter Diagnosis     ICD-10-CM    1  Lumbar spondylosis  M47 816       2  Sacroiliac joint pain  M53 3                      Assessment  Assessment details: Pt has noted significant subjective improvement since starting therapy  Her right knee is biggest restriction at this time  Has done well with flexion and trunk strength program  Small flare-up yesterday and today but at end of session was improved  Spoke with patient and she is good with current home program  Will let us know how appointment with pain management goes  I did discuss how this is a management issues and it is important that she continues  She has good understanding  Impairments: abnormal gait, abnormal or restricted ROM, activity intolerance, impaired physical strength and lacks appropriate home exercise program    Symptom irritability: moderateUnderstanding of Dx/Px/POC: good   Prognosis: good    Goals  Short Term Functional Goals - progressing  In 4 weeks patient will:   Pt will report 3/10 Lumbar pain on VAS to allow work tasks at desk    Improve functional mobility: Increased sitting and standing tolerance to 30 and 10 minutes respectively before changing position  - improving, standing more limited due to knee  Patient to be independent with HEP to maximize improvement in functional mobility  - met  pt will increase FOTO score by 10pts to reflect a statistically significant improvement  - met  ?   Long Term Functional Goals:    In 6 weeks patient will:  - progressing towards  -Pt will report 2/10 Lumbar pain on VAS to allow home and self-care tasks    -Patient will demonstrate [facility] pain-free Lumbar motion to better complete ADLs     -Improve functional mobility:  Patient will be able to walk and establish exercise program    -The patient will go for 30 minutes walks 3-4 times per week and stretch daily to improve functional mobility and activity tolerance    -Pt will score at or above predicted discharge FOTO score of 54 to reflect improvement in subjective functional ability  Plan  Plan details: TE, NMR, TA, MT, self education, and modalities as needed in order to progress through skilled PT focused on  ROM, strength, balance, coordination   Patient would benefit from: skilled physical therapy    Treatment plan discussed with: pt on hold until discusses with pain management        Subjective Evaluation    History of Present Illness  Date of onset: 10/1/2022  Mechanism of injury:   Patient has noted improvement in back pain  Has noted that it has been under control most of the time over the last few weeks and noted subjective improvement with therapy  Yesterday started having pain into R leg but after session today this was improved  R knee is big complaint at this time  She wants to hold further PT until talks to pain management   She is independent with her home program           Quality of life: good    Pain  Current pain rating: 3  At best pain ratin  At worst pain ratin  Relieving factors: ice  Progression: worsening    Social Support  Steps to enter house: yes  6  Stairs in house: no   Lives in: [facility]  Lives with: spouse    Employment status: working  Treatments  Current treatment: injection treatment  Patient Goals  Patient goals for therapy: decreased pain, increased strength, independence with ADLs/IADLs and increased motion          Objective     Concurrent Complaints  Negative for night pain, disturbed sleep, bladder dysfunction, bowel dysfunction and saddle (S4) numbness    Neurological Testing       Active Range of Motion     Lumbar   Flexion:  Restriction level: minimal - decreases/eliminates pain  Extension:  Restriction level: maximal - repetitive increases pain  Left lateral flexion:  Restriction level: moderate  Right lateral flexion:  Restriction level: moderate  Left rotation:  Restriction level: moderate  Right rotation:  Restriction level: moderate  Left Knee   Flexion: 130 degrees   Extension: -5 degrees     Right Knee   Flexion: 125 degrees   Extension: -20 degrees     Additional Active Range of Motion Details  Good hip motion     Strength/Myotome Testing     Right Knee   Flexion: 4+  Extension: WFL    Right Ankle/Foot   Dorsiflexion: WFL  Plantar flexion: WFL  Inversion: WFL  Eversion: WFL  Tests     Lumbar     Right   Slight irritation R SLR terminal range into R buttock                      Precautions: none     Daily Treatment Diary:           Initial Evaluation Date: 11/14/22  Compliance 2/2 2/6 1/9 1/12 1/16 1/19 1/23 1/30   Visit Number 11 12    5  6  7  8  9  10   Re-Eval       RE             Foto Captured                              2/2 2/6 1/23 1/30   Manual               L/S STM               Piriformis TpR               LAD               L/S Mobs                  assessment             Ther-Ex               Active Warm-up NS 10' NS 10'        nu step 10'  nu step 10'   LTR 5"x20 5"x20        5"x20   5"x20   SKTC 10x10" 10x10"        10x10" ea  10x10" ea   Hooklying ADD 2x10 5" 2x10 5"       2x10 5" ea 2x10 5" ea   Piriformis Stretch 30"x4 30"x4        30"x4  30"x4   Clamshells btb 3x12 btb 3x12        btb 3x10  2x10 h/l btb  5"x20   90/90 stretch 10x10" 10x10"        10"x10  10x10"   Supine Bridge 2x10 x15 - painful today         2x10  2x10   Hooklying ABD               Paloff press gtb 2x10        nv    SA LPD bck 2x10        nv    Standing Hip ABD/Ext                education               Neuro Re-Ed               TrA brace c Uni march x10 x10        x30  x30   TrA x10 5" x10 5"        5"x20     TrA Ball Press 3-way x15 reg, x10 single ea x15 reg         Straight only 10"x20   Straight only 10"x20                                    Ther-Act                                                                Modalities       IFC with [facility]                                                          Access Code: [email or web address]  URL: [email or web address]  Date: 01/05/2023  Prepared by: Sadie Atwood    Exercises  • Hooklying Single Knee to Chest Stretch - 1 x daily - 7 x weekly - 3 sets - 10 reps  • Supine Lower Trunk Rotation - 1 x daily - 7 x weekly - 3 sets - 10 reps  • Supine Hip Adduction Isometric with Ball - 1 x daily - 7 x weekly - 3 sets - 10 reps  • Hooklying Clamshell with Resistance - 1 x daily - 7 x weekly - 3 sets - 10 reps

## 2023-02-22 ENCOUNTER — OFFICE VISIT (OUTPATIENT)
Dept: PAIN MEDICINE | Facility: CLINIC | Age: 68
End: 2023-02-22

## 2023-02-22 VITALS
HEIGHT: 69 IN | HEART RATE: 74 BPM | TEMPERATURE: 97.6 F | SYSTOLIC BLOOD PRESSURE: 128 MMHG | BODY MASS INDEX: 43.4 KG/M2 | WEIGHT: 293 LBS | DIASTOLIC BLOOD PRESSURE: 82 MMHG

## 2023-02-22 DIAGNOSIS — M46.1 SACROILIITIS, NOT ELSEWHERE CLASSIFIED (HCC): ICD-10-CM

## 2023-02-22 DIAGNOSIS — N18.31 CHRONIC KIDNEY DISEASE, STAGE 3A (HCC): ICD-10-CM

## 2023-02-22 DIAGNOSIS — M47.896 OTHER SPONDYLOSIS, LUMBAR REGION: ICD-10-CM

## 2023-02-22 DIAGNOSIS — M53.3 SACROILIAC JOINT DYSFUNCTION OF RIGHT SIDE: Primary | ICD-10-CM

## 2023-02-22 DIAGNOSIS — F32.0 CURRENT MILD EPISODE OF MAJOR DEPRESSIVE DISORDER WITHOUT PRIOR EPISODE (HCC): ICD-10-CM

## 2023-02-22 DIAGNOSIS — M54.16 LUMBAR RADICULOPATHY: ICD-10-CM

## 2023-02-22 NOTE — PATIENT INSTRUCTIONS
Core Strengthening Exercises   WHAT YOU NEED TO KNOW:   Your core includes the muscles of your lower back, hip, pelvis, and abdomen  Core strengthening exercises help heal and strengthen these muscles  This helps prevent another injury, and keeps your pelvis, spine, and hips in the correct position  DISCHARGE INSTRUCTIONS:   Call your doctor or physical therapist if:   You have sharp or worsening pain during exercise or at rest     You have questions or concerns about your shoulder exercises  Safety tips:  Talk to your healthcare provider before you start an exercise program  A physical therapist can teach you how to do core strengthening exercises safely  Do the exercises on a mat or firm surface  A firm surface will support your spine and prevent low back pain  Do not do these exercises on a bed  Move slowly and smoothly  Avoid fast or jerky motions  Stop if you feel pain  You may feel some discomfort at first, but you should not feel pain  Tell your provider or physical therapist if you have pain while you exercise  Regular exercise will help decrease your discomfort over time  Breathe normally during core exercises  Do not hold your breath  This may cause an increase in blood pressure and prevent muscle strengthening  Your healthcare provider will tell you when to inhale and exhale during the exercise  Begin all of your exercises with abdominal bracing  Abdominal bracing helps warm up your core muscles  You can also practice abdominal bracing throughout the day  Lie on your back with your knees bent and feet flat on the floor  Place your arms in a relaxed position beside your body  Tighten your abdominal muscles  Pull your belly button in and up toward your spine  Hold for 5 seconds  Relax your muscles  Repeat 10 times  Core strengthening exercises: Your healthcare provider will tell you how often to do these exercises   The provider will also tell you how many repetitions of each exercise you should do  Hold each exercise for 5 seconds or as directed  As you get stronger, increase your hold to 10 to 15 seconds  You can do some of these exercises on a stability ball, or with a weight  Ask your healthcare provider how to use a stability ball or weight for these exercises:  Bridging:  Lie on your back with your knees bent and feet flat on the floor  Rest your arms at your side  Tighten your buttocks, and then lift your hips 1 inch off the floor  Hold for 5 seconds  When you can do this exercise without pain for 10 seconds, increase the distance you lift your hips  A good goal is to be able to lift your hips so that your shoulders, hips, and knees are in a straight line  Dead bug:  Lie on your back with your knees bent and feet flat on the floor  Place your arms in a relaxed position beside your body  Begin with abdominal bracing  Next, raise one leg, keeping your knee bent  Hold for 5 seconds  Repeat with the other leg  When you can do this exercise without pain for 10 to 15 seconds, you may raise one straight leg and hold  Repeat with the other leg  Quadruped:  Place your hands and knees on the floor  Keep your wrists directly below your shoulders and your knees directly below your hips  Pull your belly button in toward your spine  Do not flatten or arch your back  Tighten your abdominal muscles below your belly button  Hold for 5 seconds  When you can do this exercise without pain for 10 to 15 seconds, you may extend one arm and hold  Repeat on the other side  Side bridge exercises:      Standing side bridge:  Stand next to a wall and extend one arm toward the wall  Place your palm flat on the wall with your fingers pointing upward  Begin with abdominal bracing  Next, without moving your feet, slowly bend your arm to 90 degrees  Hold for 5 seconds  Repeat on the other side   When you can do this exercise without pain for 10 to 15 seconds, you may do the bent leg side bridge on the floor  Bent leg side bridge:  Lie on one side with your legs, hips, and shoulders in a straight line  Prop yourself up onto your forearm so your elbow is directly below your shoulder  Bend your knees back to 90 degrees  Begin with abdominal bracing  Next, lift your hips and balance yourself on your forearm and knees  Hold for 5 seconds  Repeat on the other side  When you can do this exercise without pain for 10 to 15 seconds, you may do the straight leg side bridge on the floor  Straight leg side bridge:  Lie on one side with your legs, hips, and shoulders in a straight line  Prop yourself up onto your forearm so your elbow is directly below your shoulder  Begin with abdominal bracing  Lift your hips off the floor and balance yourself on your forearm and the outside of your flexed foot  Do not let your ankle bend sideways  Hold for 5 seconds  Repeat on the other side  When you can do this exercise without pain for 10 to 15 seconds, ask your healthcare provider for more advanced exercises  Superman:  Lie on your stomach  Extend your arms forward on the floor  Tighten your abdominal muscles and lift your right hand and left leg off the floor  Hold this position  Slowly return to the starting position  Tighten your abdominal muscles and lift your left hand and right leg off the floor  Hold this position  Slowly return to the starting position  Clam:  Lie on your side with your knees bent  Put your bottom arm under your head to keep your neck in line  Put your top hand on your hip to keep your pelvis from moving  Put your heels together, and keep them together during this exercise  Slowly raise your top knee toward the ceiling  Then lower your leg so your knees are together  Repeat this exercise 10 times  Then switch sides and do the exercise 10 times with the other leg  Curl up:  Lie on your back with your knees bent and feet flat on the floor   Place your hands, palms down, underneath your lower back  Next, with your elbows on the floor, lift your shoulders and chest 2 to 3 inches off the floor  Keep your head in line with your shoulders  Hold this position  Slowly return to the starting position  Straight leg raises:  Lie on your back with one leg straight  Bend the other knee and place your foot flat on the floor  Tighten your abdominal muscles  Keep your leg straight and slowly lift it straight up 6 to 12 inches off the floor  Hold this position  Lower your leg slowly  Do as many repetitions as directed on this side  Repeat with the other leg  Plank:  Lie on your stomach  Bend your elbows and place your forearms flat on the floor  Lift your chest, stomach, and knees off the floor  Make sure your elbows are below your shoulders  Your body should be in a straight line  Do not let your hips or lower back sink to the ground  Squeeze your abdominal muscles together and hold for 15 seconds  To make this exercise harder, hold for 30 seconds or lift 1 leg at a time  Bicycles:  Lie on your back  Bend both knees and bring them toward your chest  Your calves should be parallel to the floor  Place the palms of your hands on the back of your head  Straighten your right leg and keep it lifted 2 inches off the floor  Raise your head and shoulders off the floor and twist towards your left  Keep your head and shoulders lifted  Bend your right knee while you straighten your left leg  Keep your left leg 2 inches off the floor  Twist your head and chest towards the left leg  Continue to straighten 1 leg at a time and twist        Follow up with your doctor or physical therapist as directed:  Write down your questions so you remember to ask them during your visits  © Copyright [facility] 2022 Information is for End User's use only and may not be sold, redistributed or otherwise used for commercial purposes  The above information is an  only   It is not intended as medical advice for individual conditions or treatments  Talk to your doctor, nurse or pharmacist before following any medical regimen to see if it is safe and effective for you

## 2023-02-22 NOTE — PROGRESS NOTES
Assessment  1  Sacroiliac joint dysfunction of right side    2  Other spondylosis, lumbar region  -     MRI lumbar spine wo contrast; Future; Expected date: 02/22/2023    3  Lumbar radiculopathy  -     MRI lumbar spine wo contrast; Future; Expected date: 02/22/2023    4  Current mild episode of major depressive disorder without prior episode ([facility] [address] )    5  BMI 40 0-44 9, adult ([facility] [address] )    6  Chronic kidney disease, stage 3a ([facility] [address] )    7  Sacroiliitis, not elsewhere classified ([facility] [address] )    No significant relief of pain or improved ability to participate with IADLs after right SIJ injection  Patient has completed PT, will order MRI lumbar spine noncontrast to better plan interventional therapies  Previously reported the following symptomatology:     Right sided axial low back pain described primarily by arthritic features  Aching, nagging, indolent, stabbing, throbbing features in axial low back with right L4 and L5 radicular pain  5/5 strength bilaterally, negative SLR  Positive facet loading maneuvers in lumbar spine elicited pain, positive tenderness to palpation over lumbar paraspinal muscles, R>L  +YANE's, +GAENSLEN's, +SIJ loading right sided; +SUSAN FINGER test right sided  Findings correlate with prominent lumbar facet arthropathy seen throughout axial low back on x-ray  Currently she is neurologically intact without gait instability, saddle anesthesia or bowel/bladder abnormality  Risks, benefits alternative to SIJ injection, medial branch blocks and subsequent radiofrequency ablation of successful thoroughly discussed with patient  Handouts provided questions answered to patient satisfaction      Plan  -mri lumbar spine noncontrast; will f/u result with patient  -encouraged patient to discuss cymbalta with pcp  -script provided for formal physical therapy for sacroiliac joint dysfunction, lumbar facet arthropathy; Physician directed home exercise plan as per [employer] demonstrated and handouts provided that patient plans to participate with for 1 hour, twice a week for the next 6 weeks  There are risks associated with opioid medications, including dependence, addiction and tolerance  The patient understands and agrees to use these medications only as prescribed  Potential side effects of the medications include, but are not limited to, constipation, drowsiness, addiction, impaired judgment and risk of fatal overdose if not taken as prescribed  The patient was warned against driving while taking sedation medications or operating heavy machinery  The patient voiced understanding  Sharing medications is a felony  At this point in time, the patient is showing no signs of addiction, abuse, diversion or suicidal ideation  [facility] Prescription Drug Monitoring Program report was reviewed and was appropriate      Complete risks and benefits including bleeding, infection, tissue reaction, nerve injury and allergic reaction were discussed  The approach was demonstrated using models and literature was provided  Verbal and written consent was obtained  My impressions and treatment recommendations were discussed in detail with the patient who verbalized understanding and had no further questions  Discharge instructions were provided  I personally saw and examined the patient and I agree with the above discussed plan of care  No orders of the defined types were placed in this encounter  History of Present Illness    No significant relief of pain or improved ability to participate with IADLs after right SIJ injection  Patient has completed PT, will order MRI lumbar spine noncontrast to better plan interventional therapies  Previously reported the following symptomatology:     Trent Whalen is a 76 y o  female with pmhx of obesity, depression, GERD, CKD presenting with chronic low back pain described primarily as arthritic in nature    She describes 8/10 low back pain that is worse in the mornings and worse at the end of the day  Pain is particularly worse in right low axial back  The pain is characterized by achy, nagging, indolent, crampy, stabbing pain in her axial low back  The patient describes that the pain is worse with standing for long periods of time on hard surfaces as well as with walking  The patient is a very active individual and feels as though this pain compromises her participation with independent activities of daily living  The pain can be debilitating at times and contribute to significant disability, compromising overall activity and independent activities of daily living  She has fully completed formal PT  Medications the patient has tried in the past include celexa, OTC tylenol  She describes minor degree of right L4 and L5 radicular pain but otherwise has good strength  She denies any weakness or numbness but endoreses paresthesias at times  The patient denies any bowel or bladder dysfunction as well, saddle anesthesia or gait instability  I have personally reviewed and/or updated the patient's past medical history, past surgical history, family history, social history, current medications, allergies, and vital signs today  Review of Systems   Constitutional: Positive for activity change  HENT: Negative  Eyes: Negative  Respiratory: Negative  Cardiovascular: Negative  Gastrointestinal: Negative  Endocrine: Negative  Genitourinary: Negative  Musculoskeletal: Positive for arthralgias, back pain, gait problem and myalgias  Skin: Negative  Allergic/Immunologic: Negative  Neurological: Negative for weakness and numbness  Hematological: Negative  Psychiatric/Behavioral: Negative  All other systems reviewed and are negative        Patient Active Problem List   Diagnosis   • Chronic pain of right knee   • Chronic right shoulder pain   • Adhesive capsulitis of right shoulder   • Primary osteoarthritis of right knee   • Class 3 severe obesity due to excess calories with serious comorbidity and body mass index (BMI) of 40 0 to 44 9 in adult [facility])   • Chronic right-sided low back pain without sciatica   • Other spondylosis, lumbar region   • Current mild episode of major depressive disorder without prior episode ([facility] [address] )   • Chronic kidney disease, stage 3a ([facility] [address] )   • Sacroiliac joint dysfunction of right side       Past Medical History:   Diagnosis Date   • Anxiety    • Arthritis     knees and feet   • Depression    • GERD (gastroesophageal reflux disease)    • Hypertension    • Sciatica    • Stomach ulcer        Past Surgical History:   Procedure Laterality Date   • APPENDECTOMY     • CHOLECYSTECTOMY     • GASTRIC BYPASS     • HERNIA REPAIR     • MD INJECT SI JOINT ARTHRGRPHY&/ANES/STEROID W/KASIA Right 11/22/2022    Procedure: BLOCK / INJECTION SACROILIAC;  Surgeon: Senait Orantes MD;  Location: [facility];  Service: Pain Management        Family History   Problem Relation Age of Onset   • Hypertension Mother    • Heart attack Mother    • Stroke Mother    • Hypertension Father    • Heart attack Father    • No Known Problems Maternal Grandmother    • No Known Problems Maternal Grandfather    • No Known Problems Paternal Grandmother    • No Known Problems Paternal Grandfather    • Cancer Maternal Aunt         bladder cancer   • No Known Problems Paternal Aunt    • No Known Problems Paternal Aunt        Social History     Occupational History   • Not on file   Tobacco Use   • Smoking status: Never   • Smokeless tobacco: Never   Vaping Use   • Vaping Use: Never used   Substance and Sexual Activity   • Alcohol use: Yes     Comment: rarely   • Drug use: Never   • Sexual activity: Not on file       Current Outpatient Medications on File Prior to Visit   Medication Sig   • citalopram (CeleXA) 20 mg tablet Take 1 tablet by mouth 2 (two) times a day   • Cyanocobalamin (VITAMIN B-12 PO) Take by mouth   • Diclofenac Sodium (VOLTAREN) 1 % Apply 2 g topically 4 (four) times a day • IRON-VITAMIN C PO Take by mouth   • losartan-hydrochlorothiazide (HYZAAR) 50-12 5 mg per tablet Take 1 tablet by mouth in the morning   • Multiple Minerals-Vitamins (CALCIUM CITRATE PLUS PO) Take by mouth   • pantoprazole (PROTONIX) 40 mg tablet Take 1 tablet by mouth daily   • torsemide (DEMADEX) 5 MG tablet prn   • VITAMIN D PO Take by mouth     No current facility-administered medications on file prior to visit  Allergies   Allergen Reactions   • Medical Tape Rash     Silicones, Other reaction(s): Erythema     • Wound Dressing Adhesive Rash     Tape adhesive causes a reaction         Physical Exam    /82 (BP Location: Left arm)   Pulse 74   Temp 97 6 °F (36 4 °C) (Temporal)   Ht 5' 9" (1 753 m)   Wt 134 kg (296 lb)   BMI 43 71 kg/m²     Constitutional: normal, well developed, well nourished, alert, in no distress and non-toxic and no overt pain behavior  and obese  Eyes: anicteric  HEENT: grossly intact  Neck: supple, symmetric, trachea midline and no masses   Pulmonary:even and unlabored  Cardiovascular:No edema or pitting edema present  Skin:Normal without rashes or lesions and well hydrated  Psychiatric:Mood and affect appropriate  Neurologic:Cranial Nerves II-XII grossly intact Sensation grossly intact; no clonus negative nunez's  Reflexes 2+ and brisk  SLR negative bilaterally  Musculoskeletal: antalgic gait, ambulates with cane  5/5 strength bilaterally with AROM in all extremities  Unable to perform normal heel toe and tip toe walking  Significant pain with lumbar facet loading bilaterally and with lateral spine rotation  ttp over lumbar paraspinal muscles  Positive right sided kassy's test, gaenslen's, SIJ loading right sided  Imaging    LUMBAR SPINE     INDICATION:   M54 50: Low back pain, unspecified  G89 29:  Other chronic pain      COMPARISON:  None     VIEWS:  XR SPINE LUMBAR 2 OR 3 VIEWS INJURY        FINDINGS:     There are 5 non rib bearing lumbar vertebral bodies      No evidence of acute fracture      Schmorl's node at T11      Degenerative spondylosis is seen in multiple levels disc space narrowing most pronounced at L4-5 and L5-S1      Grade 1 retrolisthesis of L3 as compared to L4 and L4 as compared to L5      Facet hypertrophy is most pronounced at L5-S1 with sclerosis       No significant lumbar degenerative change noted      The pedicles appear intact  Surgical clips are noted in the right upper quadrant        IMPRESSION:     Degenerative spondylosis as above    No compression deformity         Workstation performed: [id number]

## 2023-02-28 ENCOUNTER — TELEPHONE (OUTPATIENT)
Dept: OBGYN CLINIC | Facility: HOSPITAL | Age: 68
End: 2023-02-28

## 2023-02-28 NOTE — TELEPHONE ENCOUNTER
Caller: Patient    Doctor: Roseanna Dennis    Reason for call: Patient is in need of a doctor's note  She got a notice in the mail about jury duty  She called them and said she doesn't think that she would be able to do it  They told her that they need a note about her limited mobility  She thinks it would be very difficult for her to be able to get around at jury duty  Patient asking for a call from Dr Roseanna Dennis or nurse  Thank you       Call back#: [phone]

## 2023-03-01 ENCOUNTER — TELEPHONE (OUTPATIENT)
Dept: PAIN MEDICINE | Facility: CLINIC | Age: 68
End: 2023-03-01

## 2023-03-01 NOTE — TELEPHONE ENCOUNTER
S/w pt, advised same, pt says she thought our office is closest to her issue that's why she tried asking it from us

## 2023-03-01 NOTE — TELEPHONE ENCOUNTER
Caller: patient     Doctor: Miriam Reeder    Reason for call: pt states she got a letter for [employer] & she isn't able to physically get there, due to her limited mobility  She's requesting an excuse note for them       Call back#: [phone] (please contact pt note is when available)

## 2023-03-08 ENCOUNTER — TELEPHONE (OUTPATIENT)
Dept: PAIN MEDICINE | Facility: CLINIC | Age: 68
End: 2023-03-08

## 2023-03-08 NOTE — TELEPHONE ENCOUNTER
Caller: patient    Doctor: Julia Elena    Reason for call: fax MRI script to [address] in [place]      Call back#: [phone]

## 2023-10-16 ENCOUNTER — OFFICE VISIT (OUTPATIENT)
Dept: URGENT CARE | Facility: CLINIC | Age: 68
End: 2023-10-16
Payer: COMMERCIAL

## 2023-10-16 ENCOUNTER — APPOINTMENT (OUTPATIENT)
Dept: RADIOLOGY | Facility: CLINIC | Age: 68
End: 2023-10-16
Payer: COMMERCIAL

## 2023-10-16 VITALS
BODY MASS INDEX: 42.95 KG/M2 | HEIGHT: 69 IN | TEMPERATURE: 98.8 F | WEIGHT: 290 LBS | RESPIRATION RATE: 18 BRPM | DIASTOLIC BLOOD PRESSURE: 63 MMHG | HEART RATE: 79 BPM | SYSTOLIC BLOOD PRESSURE: 139 MMHG | OXYGEN SATURATION: 89 %

## 2023-10-16 DIAGNOSIS — R68.89 FLU-LIKE SYMPTOMS: ICD-10-CM

## 2023-10-16 DIAGNOSIS — R68.89 FLU-LIKE SYMPTOMS: Primary | ICD-10-CM

## 2023-10-16 LAB
SARS-COV-2 AG UPPER RESP QL IA: NEGATIVE
VALID CONTROL: NORMAL

## 2023-10-16 PROCEDURE — 87811 SARS-COV-2 COVID19 W/OPTIC: CPT | Performed by: PHYSICIAN ASSISTANT

## 2023-10-16 PROCEDURE — 71046 X-RAY EXAM CHEST 2 VIEWS: CPT

## 2023-10-16 PROCEDURE — S9083 URGENT CARE CENTER GLOBAL: HCPCS | Performed by: PHYSICIAN ASSISTANT

## 2023-10-16 PROCEDURE — 99213 OFFICE O/P EST LOW 20 MIN: CPT | Performed by: PHYSICIAN ASSISTANT

## 2023-10-16 RX ORDER — LEVOFLOXACIN 500 MG/1
500 TABLET, FILM COATED ORAL EVERY 24 HOURS
Qty: 10 TABLET | Refills: 0 | Status: SHIPPED | OUTPATIENT
Start: 2023-10-16 | End: 2023-10-26

## 2023-10-16 NOTE — LETTER
October 16, 2023     Patient: Lu Angeles   YOB: 1955   Date of Visit: 10/16/2023       To Whom It May Concern: It is my medical opinion that Lu Angeles may return to work on 10/18/23 . If you have any questions or concerns, please don't hesitate to call.          Sincerely,        Maryjane Tolentino PA-C    CC: No Recipients

## 2023-10-16 NOTE — PROGRESS NOTES
[facility]        NAME: Luke Werner is a 76 y.o. female  : 1955    MRN: [id number]  DATE: 2023  TIME: 4:50 PM    Assessment and Plan   Flu-like symptoms [R68.89]  1. Flu-like symptoms  Poct Covid 19 Rapid Antigen Test    XR chest pa & lateral    levofloxacin (LEVAQUIN) 500 mg tablet        Patient Instructions     Patient will take antibiotic as prescribed  Pt will f/u with pcp Dr. Molly Barron in 3 days  Pt opts to not go to ER at this time. Patient given strict ed precautions- will proceed to ED immediately if symptoms worsen or if sob, chest pain, abdomen/extremity swelling occurs    Chief Complaint     Chief Complaint   Patient presents with    Cold Like Symptoms     Fatigue, headaches, nausea, cough since Friday      History of Present Illness     Cough  This is a new problem. Episode onset: 10/13/23. The problem has been gradually worsening. The cough is Non-productive. Pertinent negatives include no chills, fever, nasal congestion, rhinorrhea, shortness of breath or wheezing. Associated symptoms comments: Patient endorses fatigue and nausea. Denies abdominal swelling, increased leg swelling, palpitations, chest pain. She has tried nothing for the symptoms. There is no history of COPD. patient denies hx of CHF   Patient o2 maintained at 89% throughout visit. Patient denies hx of COPD or smoking. Review of Systems   Review of Systems   Constitutional:  Negative for chills, diaphoresis, fatigue and fever. HENT:  Negative for congestion and rhinorrhea. Respiratory:  Positive for cough. Negative for chest tightness, shortness of breath and wheezing. Gastrointestinal:  Positive for nausea. Negative for abdominal pain, diarrhea and vomiting.      Current Medications       Current Outpatient Medications:     citalopram (CeleXA) 20 mg tablet, Take 1 tablet by mouth 2 (two) times a day, Disp: , Rfl:     Cyanocobalamin (VITAMIN B-12 PO), Take by mouth, Disp: , Rfl:     Diclofenac Sodium (VOLTAREN) 1 %, Apply 2 g topically 4 (four) times a day, Disp: 100 g, Rfl: 0    IRON-VITAMIN C PO, Take by mouth, Disp: , Rfl:     levofloxacin (LEVAQUIN) 500 mg tablet, Take 1 tablet (500 mg total) by mouth every 24 hours for 10 days, Disp: 10 tablet, Rfl: 0    losartan-hydrochlorothiazide (HYZAAR) 50-12.5 mg per tablet, Take 1 tablet by mouth in the morning, Disp: , Rfl:     Multiple Minerals-Vitamins (CALCIUM CITRATE PLUS PO), Take by mouth, Disp: , Rfl:     pantoprazole (PROTONIX) 40 mg tablet, Take 1 tablet by mouth daily, Disp: , Rfl:     torsemide (DEMADEX) 5 MG tablet, prn, Disp: , Rfl:     VITAMIN D PO, Take by mouth, Disp: , Rfl:     Current Allergies     Allergies as of 10/16/2023 - Reviewed 10/16/2023   Allergen Reaction Noted    Medical tape Rash 12/11/2006    Wound dressing adhesive Rash 09/06/2022            The following portions of the patient's history were reviewed and updated as appropriate: allergies, current medications, past family history, past medical history, past social history, past surgical history and problem list.     Past Medical History:   Diagnosis Date    Anxiety     Arthritis     knees and feet    Depression     GERD (gastroesophageal reflux disease)     Hypertension     Sciatica     Stomach ulcer        Past Surgical History:   Procedure Laterality Date    APPENDECTOMY      CHOLECYSTECTOMY      GASTRIC BYPASS      HERNIA REPAIR      FL INJECT SI JOINT ARTHRGRPHY&/ANES/STEROID W/KASIA Right 11/22/2022    Procedure: BLOCK / INJECTION SACROILIAC;  Surgeon: Sirisha Jones MD;  Location: [facility];  Service: Pain Management        Family History   Problem Relation Age of Onset    Hypertension Mother     Heart attack Mother     Stroke Mother     Hypertension Father     Heart attack Father     No Known Problems Maternal Grandmother     No Known Problems Maternal Grandfather     No Known Problems Paternal Grandmother     No Known Problems Paternal Grandfather     Cancer Maternal Aunt         bladder cancer    No Known Problems Paternal Aunt     No Known Problems Paternal Aunt          Medications have been verified. Objective   /63   Pulse 79   Temp 98.8 °F (37.1 °C) (Temporal)   Resp 18   Ht 5' 9" (1.753 m)   Wt 132 kg (290 lb)   SpO2 (!) 89% Comment: Walking  BMI 42.83 kg/m²   No LMP recorded. Patient is postmenopausal.       Physical Exam     Physical Exam  Constitutional:       Appearance: Normal appearance. HENT:      Nose: No mucosal edema, congestion or rhinorrhea. Mouth/Throat:      Pharynx: No pharyngeal swelling. Cardiovascular:      Rate and Rhythm: Normal rate and regular rhythm. Heart sounds: Normal heart sounds. No murmur heard. No friction rub. No gallop. Pulmonary:      Effort: Pulmonary effort is normal. No respiratory distress. Breath sounds: Normal breath sounds. No stridor. No decreased breath sounds, wheezing, rhonchi or rales. Comments: Dry cough  Abdominal:      General: Abdomen is flat. There is no distension. Palpations: Abdomen is soft. There is no mass. Tenderness: There is no abdominal tenderness. There is no guarding. Lymphadenopathy:      Cervical:      Right cervical: No superficial cervical adenopathy. Left cervical: No superficial cervical adenopathy. Neurological:      Mental Status: She is alert.

## 2025-07-23 ENCOUNTER — HOSPITAL ENCOUNTER (EMERGENCY)
Facility: HOSPITAL | Age: 70
Discharge: OTHER INSTITUTION | End: 2025-07-23
Attending: EMERGENCY MEDICINE
Payer: COMMERCIAL

## 2025-07-23 ENCOUNTER — APPOINTMENT (EMERGENCY)
Dept: RADIOLOGY | Facility: HOSPITAL | Age: 70
End: 2025-07-23
Payer: COMMERCIAL

## 2025-07-23 ENCOUNTER — OFFICE VISIT (OUTPATIENT)
Dept: URGENT CARE | Facility: CLINIC | Age: 70
End: 2025-07-23
Payer: COMMERCIAL

## 2025-07-23 ENCOUNTER — APPOINTMENT (EMERGENCY)
Dept: CT IMAGING | Facility: HOSPITAL | Age: 70
End: 2025-07-23
Payer: COMMERCIAL

## 2025-07-23 ENCOUNTER — HOSPITAL ENCOUNTER (INPATIENT)
Facility: HOSPITAL | Age: 70
LOS: 4 days | Discharge: HOME | DRG: 163 | End: 2025-07-27
Attending: EMERGENCY MEDICINE | Admitting: EMERGENCY MEDICINE
Payer: COMMERCIAL

## 2025-07-23 VITALS
OXYGEN SATURATION: 94 % | BODY MASS INDEX: 39.52 KG/M2 | WEIGHT: 267.64 LBS | RESPIRATION RATE: 25 BRPM | SYSTOLIC BLOOD PRESSURE: 136 MMHG | DIASTOLIC BLOOD PRESSURE: 88 MMHG | TEMPERATURE: 97.6 F | HEART RATE: 111 BPM

## 2025-07-23 VITALS
HEART RATE: 120 BPM | DIASTOLIC BLOOD PRESSURE: 81 MMHG | RESPIRATION RATE: 20 BRPM | SYSTOLIC BLOOD PRESSURE: 126 MMHG | TEMPERATURE: 97.9 F | WEIGHT: 290 LBS | BODY MASS INDEX: 42.83 KG/M2 | OXYGEN SATURATION: 95 %

## 2025-07-23 DIAGNOSIS — I26.09 PULMONARY EMBOLISM WITH ACUTE COR PULMONALE (HCC): Primary | ICD-10-CM

## 2025-07-23 DIAGNOSIS — R06.02 SOB (SHORTNESS OF BREATH): Primary | ICD-10-CM

## 2025-07-23 DIAGNOSIS — R06.02 SOB (SHORTNESS OF BREATH): ICD-10-CM

## 2025-07-23 DIAGNOSIS — R60.0 BILATERAL EDEMA OF LOWER EXTREMITY: ICD-10-CM

## 2025-07-23 DIAGNOSIS — I26.02 ACUTE SADDLE PULMONARY EMBOLISM WITH ACUTE COR PULMONALE (HCC): ICD-10-CM

## 2025-07-23 DIAGNOSIS — R09.02 HYPOXIA: Primary | ICD-10-CM

## 2025-07-23 DIAGNOSIS — R42 LIGHT HEADEDNESS: ICD-10-CM

## 2025-07-23 PROBLEM — K21.9 GERD (GASTROESOPHAGEAL REFLUX DISEASE): Status: ACTIVE | Noted: 2025-07-23

## 2025-07-23 PROBLEM — I10 HYPERTENSION: Status: ACTIVE | Noted: 2025-07-23

## 2025-07-23 PROBLEM — R82.81 PYURIA: Status: ACTIVE | Noted: 2025-07-23

## 2025-07-23 LAB
2HR DELTA HS TROPONIN: -6 NG/L
ABO GROUP BLD: NORMAL
ABO GROUP BLD: NORMAL
ALBUMIN SERPL BCG-MCNC: 3.7 G/DL (ref 3.5–5)
ALP SERPL-CCNC: 77 U/L (ref 34–104)
ALT SERPL W P-5'-P-CCNC: 10 U/L (ref 7–52)
ANION GAP SERPL CALCULATED.3IONS-SCNC: 10 MMOL/L (ref 4–13)
APTT PPP: 32 SECONDS (ref 23–34)
AST SERPL W P-5'-P-CCNC: 17 U/L (ref 13–39)
BACTERIA UR QL AUTO: ABNORMAL /HPF
BASE EX.OXY STD BLDV CALC-SCNC: 82.3 % (ref 60–80)
BASE EXCESS BLDV CALC-SCNC: -2.9 MMOL/L
BASOPHILS # BLD AUTO: 0.03 THOUSANDS/ÂΜL (ref 0–0.1)
BASOPHILS NFR BLD AUTO: 0 % (ref 0–1)
BILIRUB SERPL-MCNC: 0.62 MG/DL (ref 0.2–1)
BILIRUB UR QL STRIP: NEGATIVE
BLD GP AB SCN SERPL QL: NEGATIVE
BNP SERPL-MCNC: 386 PG/ML (ref 0–100)
BUN SERPL-MCNC: 17 MG/DL (ref 5–25)
CALCIUM SERPL-MCNC: 9.1 MG/DL (ref 8.4–10.2)
CAOX CRY URNS QL MICRO: ABNORMAL /HPF
CARDIAC TROPONIN I PNL SERPL HS: 312 NG/L (ref ?–50)
CARDIAC TROPONIN I PNL SERPL HS: 318 NG/L (ref ?–50)
CHLORIDE SERPL-SCNC: 108 MMOL/L (ref 96–108)
CLARITY UR: ABNORMAL
CO2 SERPL-SCNC: 23 MMOL/L (ref 21–32)
COLOR UR: YELLOW
CREAT SERPL-MCNC: 1.11 MG/DL (ref 0.6–1.3)
EOSINOPHIL # BLD AUTO: 0.08 THOUSAND/ÂΜL (ref 0–0.61)
EOSINOPHIL NFR BLD AUTO: 1 % (ref 0–6)
ERYTHROCYTE [DISTWIDTH] IN BLOOD BY AUTOMATED COUNT: 13.8 % (ref 11.6–15.1)
ERYTHROCYTE [DISTWIDTH] IN BLOOD BY AUTOMATED COUNT: 13.9 % (ref 11.6–15.1)
EST. AVERAGE GLUCOSE BLD GHB EST-MCNC: 137 MG/DL
FLUAV AG UPPER RESP QL IA.RAPID: NEGATIVE
FLUBV AG UPPER RESP QL IA.RAPID: NEGATIVE
GFR SERPL CREATININE-BSD FRML MDRD: 50 ML/MIN/1.73SQ M
GLUCOSE SERPL-MCNC: 140 MG/DL (ref 65–140)
GLUCOSE SERPL-MCNC: 205 MG/DL (ref 65–140)
GLUCOSE UR STRIP-MCNC: NEGATIVE MG/DL
HBA1C MFR BLD: 6.4 %
HCO3 BLDV-SCNC: 21.3 MMOL/L (ref 24–30)
HCT VFR BLD AUTO: 43.2 % (ref 34.8–46.1)
HCT VFR BLD AUTO: 47.1 % (ref 34.8–46.1)
HGB BLD-MCNC: 14.5 G/DL (ref 11.5–15.4)
HGB BLD-MCNC: 15.6 G/DL (ref 11.5–15.4)
HGB UR QL STRIP.AUTO: NEGATIVE
HYALINE CASTS #/AREA URNS LPF: ABNORMAL /LPF
IMM GRANULOCYTES # BLD AUTO: 0.03 THOUSAND/UL (ref 0–0.2)
IMM GRANULOCYTES NFR BLD AUTO: 0 % (ref 0–2)
INR PPP: 1 (ref 0.85–1.19)
KETONES UR STRIP-MCNC: NEGATIVE MG/DL
LACTATE SERPL-SCNC: 1.9 MMOL/L (ref 0.5–2)
LACTATE SERPL-SCNC: 2.7 MMOL/L (ref 0.5–2)
LEUKOCYTE ESTERASE UR QL STRIP: ABNORMAL
LYMPHOCYTES # BLD AUTO: 0.96 THOUSANDS/ÂΜL (ref 0.6–4.47)
LYMPHOCYTES NFR BLD AUTO: 10 % (ref 14–44)
MAGNESIUM SERPL-MCNC: 2 MG/DL (ref 1.9–2.7)
MCH RBC QN AUTO: 31.2 PG (ref 26.8–34.3)
MCH RBC QN AUTO: 31.8 PG (ref 26.8–34.3)
MCHC RBC AUTO-ENTMCNC: 33.1 G/DL (ref 31.4–37.4)
MCHC RBC AUTO-ENTMCNC: 33.6 G/DL (ref 31.4–37.4)
MCV RBC AUTO: 94 FL (ref 82–98)
MCV RBC AUTO: 95 FL (ref 82–98)
MONOCYTES # BLD AUTO: 0.67 THOUSAND/ÂΜL (ref 0.17–1.22)
MONOCYTES NFR BLD AUTO: 7 % (ref 4–12)
MUCOUS THREADS UR QL AUTO: ABNORMAL
NEUTROPHILS # BLD AUTO: 7.72 THOUSANDS/ÂΜL (ref 1.85–7.62)
NEUTS SEG NFR BLD AUTO: 82 % (ref 43–75)
NITRITE UR QL STRIP: POSITIVE
NON-SQ EPI CELLS URNS QL MICRO: ABNORMAL /HPF
NRBC BLD AUTO-RTO: 0 /100 WBCS
O2 CT BLDV-SCNC: 19.3 ML/DL
PCO2 BLDV: 36.1 MM HG (ref 42–50)
PH BLDV: 7.39 [PH] (ref 7.3–7.4)
PH UR STRIP.AUTO: 5.5 [PH]
PLATELET # BLD AUTO: 185 THOUSANDS/UL (ref 149–390)
PLATELET # BLD AUTO: 192 THOUSANDS/UL (ref 149–390)
PMV BLD AUTO: 11.1 FL (ref 8.9–12.7)
PMV BLD AUTO: 11.3 FL (ref 8.9–12.7)
PO2 BLDV: 48.8 MM HG (ref 35–45)
POTASSIUM SERPL-SCNC: 3.6 MMOL/L (ref 3.5–5.3)
PROCALCITONIN SERPL-MCNC: <0.05 NG/ML
PROT SERPL-MCNC: 6.4 G/DL (ref 6.4–8.4)
PROT UR STRIP-MCNC: NEGATIVE MG/DL
PROTHROMBIN TIME: 13.7 SECONDS (ref 12.3–15)
RBC # BLD AUTO: 4.56 MILLION/UL (ref 3.81–5.12)
RBC # BLD AUTO: 5 MILLION/UL (ref 3.81–5.12)
RBC #/AREA URNS AUTO: ABNORMAL /HPF
RH BLD: POSITIVE
RH BLD: POSITIVE
SARS-COV+SARS-COV-2 AG RESP QL IA.RAPID: NEGATIVE
SODIUM SERPL-SCNC: 141 MMOL/L (ref 135–147)
SP GR UR STRIP.AUTO: 1.01 (ref 1–1.03)
SPECIMEN EXPIRATION DATE: NORMAL
UROBILINOGEN UR QL STRIP.AUTO: 0.2 E.U./DL
WBC # BLD AUTO: 8.41 THOUSAND/UL (ref 4.31–10.16)
WBC # BLD AUTO: 9.49 THOUSAND/UL (ref 4.31–10.16)
WBC #/AREA URNS AUTO: ABNORMAL /HPF

## 2025-07-23 PROCEDURE — 96365 THER/PROPH/DIAG IV INF INIT: CPT

## 2025-07-23 PROCEDURE — 85730 THROMBOPLASTIN TIME PARTIAL: CPT | Performed by: PHYSICIAN ASSISTANT

## 2025-07-23 PROCEDURE — 81001 URINALYSIS AUTO W/SCOPE: CPT | Performed by: PHYSICIAN ASSISTANT

## 2025-07-23 PROCEDURE — 83036 HEMOGLOBIN GLYCOSYLATED A1C: CPT | Performed by: STUDENT IN AN ORGANIZED HEALTH CARE EDUCATION/TRAINING PROGRAM

## 2025-07-23 PROCEDURE — 71045 X-RAY EXAM CHEST 1 VIEW: CPT

## 2025-07-23 PROCEDURE — 84484 ASSAY OF TROPONIN QUANT: CPT | Performed by: PHYSICIAN ASSISTANT

## 2025-07-23 PROCEDURE — 86900 BLOOD TYPING SEROLOGIC ABO: CPT | Performed by: PHYSICIAN ASSISTANT

## 2025-07-23 PROCEDURE — 83605 ASSAY OF LACTIC ACID: CPT | Performed by: PHYSICIAN ASSISTANT

## 2025-07-23 PROCEDURE — 99223 1ST HOSP IP/OBS HIGH 75: CPT | Performed by: INTERNAL MEDICINE

## 2025-07-23 PROCEDURE — 87081 CULTURE SCREEN ONLY: CPT | Performed by: STUDENT IN AN ORGANIZED HEALTH CARE EDUCATION/TRAINING PROGRAM

## 2025-07-23 PROCEDURE — 83735 ASSAY OF MAGNESIUM: CPT | Performed by: PHYSICIAN ASSISTANT

## 2025-07-23 PROCEDURE — 96375 TX/PRO/DX INJ NEW DRUG ADDON: CPT

## 2025-07-23 PROCEDURE — 85025 COMPLETE CBC W/AUTO DIFF WBC: CPT | Performed by: PHYSICIAN ASSISTANT

## 2025-07-23 PROCEDURE — 87804 INFLUENZA ASSAY W/OPTIC: CPT | Performed by: PHYSICIAN ASSISTANT

## 2025-07-23 PROCEDURE — 36415 COLL VENOUS BLD VENIPUNCTURE: CPT | Performed by: PHYSICIAN ASSISTANT

## 2025-07-23 PROCEDURE — 87040 BLOOD CULTURE FOR BACTERIA: CPT | Performed by: PHYSICIAN ASSISTANT

## 2025-07-23 PROCEDURE — 82948 REAGENT STRIP/BLOOD GLUCOSE: CPT

## 2025-07-23 PROCEDURE — 83880 ASSAY OF NATRIURETIC PEPTIDE: CPT | Performed by: PHYSICIAN ASSISTANT

## 2025-07-23 PROCEDURE — S9083 URGENT CARE CENTER GLOBAL: HCPCS

## 2025-07-23 PROCEDURE — 93005 ELECTROCARDIOGRAM TRACING: CPT

## 2025-07-23 PROCEDURE — 80053 COMPREHEN METABOLIC PANEL: CPT | Performed by: PHYSICIAN ASSISTANT

## 2025-07-23 PROCEDURE — 99285 EMERGENCY DEPT VISIT HI MDM: CPT

## 2025-07-23 PROCEDURE — 96360 HYDRATION IV INFUSION INIT: CPT

## 2025-07-23 PROCEDURE — 99214 OFFICE O/P EST MOD 30 MIN: CPT

## 2025-07-23 PROCEDURE — 84145 PROCALCITONIN (PCT): CPT | Performed by: PHYSICIAN ASSISTANT

## 2025-07-23 PROCEDURE — 87811 SARS-COV-2 COVID19 W/OPTIC: CPT | Performed by: PHYSICIAN ASSISTANT

## 2025-07-23 PROCEDURE — 82805 BLOOD GASES W/O2 SATURATION: CPT | Performed by: PHYSICIAN ASSISTANT

## 2025-07-23 PROCEDURE — 86850 RBC ANTIBODY SCREEN: CPT | Performed by: PHYSICIAN ASSISTANT

## 2025-07-23 PROCEDURE — 94760 N-INVAS EAR/PLS OXIMETRY 1: CPT

## 2025-07-23 PROCEDURE — 85027 COMPLETE CBC AUTOMATED: CPT | Performed by: STUDENT IN AN ORGANIZED HEALTH CARE EDUCATION/TRAINING PROGRAM

## 2025-07-23 PROCEDURE — 71275 CT ANGIOGRAPHY CHEST: CPT

## 2025-07-23 PROCEDURE — 99291 CRITICAL CARE FIRST HOUR: CPT | Performed by: PHYSICIAN ASSISTANT

## 2025-07-23 PROCEDURE — 85610 PROTHROMBIN TIME: CPT | Performed by: PHYSICIAN ASSISTANT

## 2025-07-23 PROCEDURE — 86901 BLOOD TYPING SEROLOGIC RH(D): CPT | Performed by: PHYSICIAN ASSISTANT

## 2025-07-23 PROCEDURE — 94660 CPAP INITIATION&MGMT: CPT

## 2025-07-23 RX ORDER — HEPARIN SODIUM 10000 [USP'U]/100ML
3-30 INJECTION, SOLUTION INTRAVENOUS
Status: DISCONTINUED | OUTPATIENT
Start: 2025-07-23 | End: 2025-07-23 | Stop reason: HOSPADM

## 2025-07-23 RX ORDER — TORSEMIDE 10 MG/1
10 TABLET ORAL DAILY
Status: DISCONTINUED | OUTPATIENT
Start: 2025-07-24 | End: 2025-07-26

## 2025-07-23 RX ORDER — BISACODYL 10 MG
10 SUPPOSITORY, RECTAL RECTAL DAILY PRN
Status: DISCONTINUED | OUTPATIENT
Start: 2025-07-23 | End: 2025-07-27 | Stop reason: HOSPADM

## 2025-07-23 RX ORDER — HEPARIN SODIUM 1000 [USP'U]/ML
9600 INJECTION, SOLUTION INTRAVENOUS; SUBCUTANEOUS ONCE
Status: COMPLETED | OUTPATIENT
Start: 2025-07-23 | End: 2025-07-23

## 2025-07-23 RX ORDER — HEPARIN SODIUM 10000 [USP'U]/100ML
3-30 INJECTION, SOLUTION INTRAVENOUS
Status: DISCONTINUED | OUTPATIENT
Start: 2025-07-23 | End: 2025-07-25

## 2025-07-23 RX ORDER — SODIUM CHLORIDE, SODIUM GLUCONATE, SODIUM ACETATE, POTASSIUM CHLORIDE, MAGNESIUM CHLORIDE, SODIUM PHOSPHATE, DIBASIC, AND POTASSIUM PHOSPHATE .53; .5; .37; .037; .03; .012; .00082 G/100ML; G/100ML; G/100ML; G/100ML; G/100ML; G/100ML; G/100ML
100 INJECTION, SOLUTION INTRAVENOUS CONTINUOUS
Status: DISCONTINUED | OUTPATIENT
Start: 2025-07-23 | End: 2025-07-23

## 2025-07-23 RX ORDER — PANTOPRAZOLE SODIUM 40 MG/1
40 TABLET, DELAYED RELEASE ORAL
Status: DISCONTINUED | OUTPATIENT
Start: 2025-07-24 | End: 2025-07-27 | Stop reason: HOSPADM

## 2025-07-23 RX ORDER — DULOXETIN HYDROCHLORIDE 30 MG/1
1 CAPSULE, DELAYED RELEASE ORAL DAILY
COMMUNITY
Start: 2025-05-10

## 2025-07-23 RX ORDER — HEPARIN SODIUM 1000 [USP'U]/ML
9600 INJECTION, SOLUTION INTRAVENOUS; SUBCUTANEOUS EVERY 6 HOURS PRN
Status: DISCONTINUED | OUTPATIENT
Start: 2025-07-23 | End: 2025-07-23 | Stop reason: HOSPADM

## 2025-07-23 RX ORDER — ALBUTEROL SULFATE 90 UG/1
2 INHALANT RESPIRATORY (INHALATION) EVERY 4 HOURS PRN
Status: DISCONTINUED | OUTPATIENT
Start: 2025-07-23 | End: 2025-07-27 | Stop reason: HOSPADM

## 2025-07-23 RX ORDER — INSULIN LISPRO 100 [IU]/ML
2-12 INJECTION, SOLUTION INTRAVENOUS; SUBCUTANEOUS EVERY 6 HOURS SCHEDULED
Status: DISCONTINUED | OUTPATIENT
Start: 2025-07-24 | End: 2025-07-25

## 2025-07-23 RX ORDER — HEPARIN SODIUM 1000 [USP'U]/ML
4800 INJECTION, SOLUTION INTRAVENOUS; SUBCUTANEOUS EVERY 6 HOURS PRN
Status: DISCONTINUED | OUTPATIENT
Start: 2025-07-23 | End: 2025-07-25

## 2025-07-23 RX ORDER — CHLORHEXIDINE GLUCONATE ORAL RINSE 1.2 MG/ML
15 SOLUTION DENTAL EVERY 12 HOURS SCHEDULED
Status: DISCONTINUED | OUTPATIENT
Start: 2025-07-23 | End: 2025-07-25

## 2025-07-23 RX ORDER — PNV NO.95/FERROUS FUM/FOLIC AC 28MG-0.8MG
TABLET ORAL EVERY OTHER DAY
Status: DISCONTINUED | OUTPATIENT
Start: 2025-07-23 | End: 2025-07-23

## 2025-07-23 RX ORDER — HEPARIN SODIUM 1000 [USP'U]/ML
9600 INJECTION, SOLUTION INTRAVENOUS; SUBCUTANEOUS EVERY 6 HOURS PRN
Status: DISCONTINUED | OUTPATIENT
Start: 2025-07-23 | End: 2025-07-25

## 2025-07-23 RX ORDER — CITALOPRAM HYDROBROMIDE 20 MG/1
20 TABLET ORAL 2 TIMES DAILY
Status: DISCONTINUED | OUTPATIENT
Start: 2025-07-23 | End: 2025-07-24

## 2025-07-23 RX ORDER — DULOXETIN HYDROCHLORIDE 30 MG/1
30 CAPSULE, DELAYED RELEASE ORAL DAILY
Status: DISCONTINUED | OUTPATIENT
Start: 2025-07-23 | End: 2025-07-27 | Stop reason: HOSPADM

## 2025-07-23 RX ORDER — FERROUS SULFATE 325(65) MG
325 TABLET ORAL DAILY
Status: DISCONTINUED | OUTPATIENT
Start: 2025-07-23 | End: 2025-07-24

## 2025-07-23 RX ORDER — HEPARIN SODIUM 1000 [USP'U]/ML
4800 INJECTION, SOLUTION INTRAVENOUS; SUBCUTANEOUS EVERY 6 HOURS PRN
Status: DISCONTINUED | OUTPATIENT
Start: 2025-07-23 | End: 2025-07-23 | Stop reason: HOSPADM

## 2025-07-23 RX ORDER — LOSARTAN POTASSIUM 50 MG/1
50 TABLET ORAL DAILY
Status: DISCONTINUED | OUTPATIENT
Start: 2025-07-23 | End: 2025-07-27 | Stop reason: HOSPADM

## 2025-07-23 RX ORDER — HYDROCHLOROTHIAZIDE 12.5 MG/1
12.5 TABLET ORAL DAILY
Status: DISCONTINUED | OUTPATIENT
Start: 2025-07-23 | End: 2025-07-27 | Stop reason: HOSPADM

## 2025-07-23 RX ADMIN — CHLORHEXIDINE GLUCONATE 15 ML: 1.2 SOLUTION ORAL at 21:16

## 2025-07-23 RX ADMIN — FERROUS SULFATE TAB 325 MG (65 MG ELEMENTAL FE) 325 MG: 325 (65 FE) TAB at 21:16

## 2025-07-23 RX ADMIN — HEPARIN SODIUM 9600 UNITS: 1000 INJECTION, SOLUTION INTRAVENOUS; SUBCUTANEOUS at 18:42

## 2025-07-23 RX ADMIN — SODIUM CHLORIDE, SODIUM GLUCONATE, SODIUM ACETATE, POTASSIUM CHLORIDE, MAGNESIUM CHLORIDE, SODIUM PHOSPHATE, DIBASIC, AND POTASSIUM PHOSPHATE 100 ML/HR: .53; .5; .37; .037; .03; .012; .00082 INJECTION, SOLUTION INTRAVENOUS at 20:45

## 2025-07-23 RX ADMIN — DICLOFENAC SODIUM 2 G: 10 GEL TOPICAL at 21:16

## 2025-07-23 RX ADMIN — HEPARIN SODIUM 18 UNITS/KG/HR: 10000 INJECTION, SOLUTION INTRAVENOUS at 20:32

## 2025-07-23 RX ADMIN — SODIUM CHLORIDE 1000 ML: 0.9 INJECTION, SOLUTION INTRAVENOUS at 16:57

## 2025-07-23 RX ADMIN — HEPARIN SODIUM 18 UNITS/KG/HR: 10000 INJECTION, SOLUTION INTRAVENOUS at 18:43

## 2025-07-23 RX ADMIN — IOHEXOL 85 ML: 350 INJECTION, SOLUTION INTRAVENOUS at 17:51

## 2025-07-23 NOTE — EMTALA/ACUTE CARE TRANSFER
[facility] EMERGENCY DEPARTMENT  [address] [address]  Dept: [phone]      EMTALA TRANSFER CONSENT    NAME Edie NELSON 1955                              MRN [id number]    I have been informed of my rights regarding examination, treatment, and transfer   by Dr. Wing Luz MD    Benefits: Specialized equipment and/or services available at the receiving facility (Include comment)________________________    Risks: Potential for delay in receiving treatment, Potential deterioration of medical condition, Increased discomfort during transfer, Loss of IV, Possible worsening of condition or death during transfer      Consent for Transfer:  I acknowledge that my medical condition has been evaluated and explained to me by the emergency department physician or other qualified medical person and/or my attending physician, who has recommended that I be transferred to the service of  Accepting Physician: Dr. Ellsworth at Accepting Facility Name, City & State : \[facility] [place] [facility]\"". The above potential benefits of such transfer, the potential risks associated with such transfer, and the probable risks of not being transferred have been explained to me, and I fully understand them.  The doctor has explained that, in my case, the benefits of transfer outweigh the risks.  I agree to be transferred.    I authorize the performance of emergency medical procedures and treatments upon me in both transit and upon arrival at the receiving facility.  Additionally, I authorize the release of any and all medical records to the receiving facility and request they be transported with me, if possible.  I understand that the safest mode of transportation during a medical emergency is an ambulance and that the Hospital advocates the use of this mode of transport. Risks of traveling to the receiving facility by car, including absence of medical control, life sustaining  equipment, such as oxygen, and medical personnel has been explained to me and I fully understand them.    (CLIFFORD CORRECT BOX BELOW)  [  ]  I consent to the stated transfer and to be transported by ambulance/helicopter.  [  ]  I consent to the stated transfer, but refuse transportation by ambulance and accept full responsibility for my transportation by car.  I understand the risks of non-ambulance transfers and I exonerate the Hospital and its staff from any deterioration in my condition that results from this refusal.    X___________________________________________    DATE  25  TIME________  Signature of patient or legally responsible individual signing on patient behalf           RELATIONSHIP TO PATIENT_________________________          Provider Certification    NAME Edie Crum                                         1955                              MRN [id number]    A medical screening exam was performed on the above named patient.  Based on the examination:    Condition Necessitating Transfer The primary encounter diagnosis was Hypoxia. Diagnoses of SOB (shortness of breath) and Acute saddle pulmonary embolism with acute cor pulmonale (HCC) were also pertinent to this visit.    Patient Condition: The patient has been stabilized such that within reasonable medical probability, no material deterioration of the patient condition or the condition of the unborn child(lluvia) is likely to result from the transfer    Reason for Transfer: Level of Care needed not available at this facility    Transfer Requirements: Facility B   Space available and qualified personnel available for treatment as acknowledged by    Agreed to accept transfer and to provide appropriate medical treatment as acknowledged by       Dr. Ellsworth  Appropriate medical records of the examination and treatment of the patient are provided at the time of transfer   STAFF INITIAL WHEN COMPLETED _______  Transfer will be performed by  qualified personnel from    and appropriate transfer equipment as required, including the use of necessary and appropriate life support measures.    Provider Certification: I have examined the patient and explained the following risks and benefits of being transferred/refusing transfer to the patient/family:  General risk, such as traffic hazards, adverse weather conditions, rough terrain or turbulence, possible failure of equipment (including vehicle or aircraft), or consequences of actions of persons outside the control of the transport personnel, Unanticipated needs of medical equipment and personnel during transport, Risk of worsening condition, The possibility of a transport vehicle being unavailable      Based on these reasonable risks and benefits to the patient and/or the unborn child(lluvia), and based upon the information available at the time of the patient’s examination, I certify that the medical benefits reasonably to be expected from the provision of appropriate medical treatments at another medical facility outweigh the increasing risks, if any, to the individual’s medical condition, and in the case of labor to the unborn child, from effecting the transfer.    X____________________________________________ DATE 07/23/25        TIME_______      ORIGINAL - SEND TO MEDICAL RECORDS   COPY - SEND WITH PATIENT DURING TRANSFER

## 2025-07-23 NOTE — PROGRESS NOTES
[facility]  Name: Edie Crum      : 1955      MRN: [id number]  Encounter Provider: Kevin Curran PA-C  Encounter Date: 2025   Encounter department: [facility] [place]  :  Assessment & Plan  Bilateral edema of lower extremity    Orders:    ECG 12 lead    SOB (shortness of breath)    Orders:    ECG 12 lead    Light headedness    Orders:    ECG 12 lead    Patient EKG showing PVCs.  Blood sugar 205.  Had tachycardic with hypoxia sitting at 89 to 90% without oxygen, bumping up to 94 average on 2 L oxygen nasal cannula.  Decreased breath sounds mid to bilateral lower fields.  2+ pitting edema bilateral lower extremities.  At this time concern for potential fluid overload CHF exacerbation leading to shortness of breath, referred patient to emergency room for evaluation.  Patient drove to office herself today, unsafe for her to drive ambulance was called and patient agreed to transport herself to hospital via ambulance.  Ambulance came and picked up patient for transport.    Patient Instructions  Follow up with PCP in 3-5 days.  Proceed to  ER if symptoms worsen.    If tests are performed, our office will contact you with results only if changes need to made to the care plan discussed with you at the visit. You can review your full results on [facility] MyChart.    Chief Complaint:   Chief Complaint   Patient presents with    Dizziness     Patient c/o dizziness and SOB.      History of Present Illness   70-year-old female PMH obesity, HTN, self-reported prediabetic, anxiety, and arthritis presenting for acute onset shortness of breath and lightheadedness starting yesterday afternoon.  Denies prior incidence, since yesterday with onset has been feeling winded upon minimal exertion, difficulty catching her breath, feeling that she cannot get a full breath, lightheaded and dizzy that she describes as unsteady on her feet.  Does not get any relief at rest.  Noticed swelling in bilateral lower  extremities now that this provider pointed out and asked her.  Denies fevers chills body aches nausea vomiting or diarrhea.  Denies sick contacts.  States that someone in family did have history of heart disease but nothing personal that she was ever diagnosed with.  Denies any recent medication changes.  Denies any recent illness.  Took regular medication as prescribed normally.  Reported a slight cough.  Denies chest pain.  States she takes a water pill for her knees but unable to truly explain why she takes water pill when asked in office.    Dizziness  Associated symptoms include coughing and fatigue. Pertinent negatives include no arthralgias, chest pain, chills, congestion, diaphoresis, fever, headaches, myalgias, nausea, sore throat or vomiting.         Review of Systems   Constitutional:  Positive for fatigue. Negative for chills, diaphoresis and fever.   HENT:  Negative for congestion, ear discharge, ear pain, postnasal drip, rhinorrhea, sinus pressure and sore throat.    Respiratory:  Positive for cough, chest tightness, shortness of breath and wheezing. Negative for choking.    Cardiovascular:  Positive for leg swelling. Negative for chest pain and palpitations.   Gastrointestinal:  Negative for diarrhea, nausea and vomiting.   Musculoskeletal:  Negative for arthralgias and myalgias.   Skin:  Negative for color change.   Neurological:  Positive for dizziness and light-headedness. Negative for headaches.     Past Medical History   Past Medical History[1]  Past Surgical History[2]  Family History[3]  she reports that she has never smoked. She has never used smokeless tobacco. She reports current alcohol use. She reports that she does not use drugs.  Current Outpatient Medications   Medication Instructions    citalopram (CeleXA) 20 mg tablet 1 tablet, 2 times daily    Cyanocobalamin (VITAMIN B-12 PO) Take by mouth    Diclofenac Sodium (VOLTAREN) 2 g, Topical, 4 times daily    DULoxetine (CYMBALTA) 30 mg  delayed release capsule 1 capsule, Daily    Ferrous Sulfate (IRON PO) 1 tablet, Daily    IRON-VITAMIN C PO Take by mouth    losartan-hydrochlorothiazide (HYZAAR) 50-12.5 mg per tablet 1 tablet, Daily    Multiple Minerals-Vitamins (CALCIUM CITRATE PLUS PO) Take by mouth    pantoprazole (PROTONIX) 40 mg tablet 1 tablet, Daily    torsemide (DEMADEX) 5 MG tablet prn    VITAMIN D PO Take by mouth   Allergies[4]     Objective   /81   Pulse (!) 120   Temp 97.9 °F (36.6 °C)   Resp 20   Wt 132 kg (290 lb)   SpO2 95% Comment: 3L Oxygen  BMI 42.83 kg/m²      Physical Exam  Vitals and nursing note reviewed.   Constitutional:       General: She is not in acute distress.     Appearance: She is normal weight.   HENT:      Head: Normocephalic and atraumatic.      Right Ear: Tympanic membrane, ear canal and external ear normal.      Left Ear: Tympanic membrane, ear canal and external ear normal.      Nose: Nose normal. No congestion.      Mouth/Throat:      Mouth: Mucous membranes are moist.      Pharynx: Oropharynx is clear. No oropharyngeal exudate.     Eyes:      General:         Right eye: No discharge.         Left eye: No discharge.      Conjunctiva/sclera: Conjunctivae normal.      Pupils: Pupils are equal, round, and reactive to light.       Cardiovascular:      Rate and Rhythm: Regular rhythm. Tachycardia present.      Pulses: Normal pulses.      Heart sounds: Normal heart sounds.   Pulmonary:      Comments: Patient visibly struggling to take regular breath and upon auscultation has decreased breath sounds from bilateral mid to lower fields.  No audible wheezing rhonchi or rales.  Satting at 89% oxygen at her lowest and bumped up to 94-95% average on 2 L oxygen nasal cannula.  Abdominal:      General: Abdomen is flat. Bowel sounds are normal.      Tenderness: There is no abdominal tenderness.     Musculoskeletal:      Comments: 2+ bilateral pitting lower edema.  Neurovascular intact with 2+ pedal pulses.  "  Lymphadenopathy:      Cervical: No cervical adenopathy.     Skin:     General: Skin is warm.      Capillary Refill: Capillary refill takes less than 2 seconds.     Neurological:      General: No focal deficit present.      Mental Status: She is alert and oriented to person, place, and time.     Psychiatric:         Mood and Affect: Mood normal.         Behavior: Behavior normal.         Portions of the record may have been created with voice recognition software.  Occasional wrong word or \"sound a like\" substitutions may have occurred due to the inherent limitations of voice recognition software.  Read the chart carefully and recognize, using context, where substitutions have occurred.       [1]   Past Medical History:  Diagnosis Date    Anxiety     Arthritis     knees and feet    Depression     GERD (gastroesophageal reflux disease)     Hypertension     Sciatica     Stomach ulcer    [2]   Past Surgical History:  Procedure Laterality Date    APPENDECTOMY      CHOLECYSTECTOMY      GASTRIC BYPASS      HERNIA REPAIR      OH INJECT SI JOINT ARTHRGRPHY&/ANES/STEROID W/KASIA Right 11/22/2022    Procedure: BLOCK / INJECTION SACROILIAC;  Surgeon: Jayy Castano MD;  Location: [facility];  Service: Pain Management    [3]   Family History  Problem Relation Name Age of Onset    Hypertension Mother      Heart attack Mother      Stroke Mother      Hypertension Father      Heart attack Father      No Known Problems Maternal Grandmother      No Known Problems Maternal Grandfather      No Known Problems Paternal Grandmother      No Known Problems Paternal Grandfather      Cancer Maternal Aunt          bladder cancer    No Known Problems Paternal Aunt      No Known Problems Paternal Aunt     [4]   Allergies  Allergen Reactions    Medical Tape Rash     Silicones, Other reaction(s): Erythema    Wound Dressing Adhesive Rash     Tape adhesive causes a reaction     "

## 2025-07-24 ENCOUNTER — APPOINTMENT (INPATIENT)
Dept: NON INVASIVE DIAGNOSTICS | Facility: HOSPITAL | Age: 70
DRG: 163 | End: 2025-07-24
Payer: COMMERCIAL

## 2025-07-24 ENCOUNTER — APPOINTMENT (INPATIENT)
Dept: RADIOLOGY | Facility: HOSPITAL | Age: 70
DRG: 163 | End: 2025-07-24
Attending: NURSE PRACTITIONER
Payer: COMMERCIAL

## 2025-07-24 ENCOUNTER — ANESTHESIA EVENT (INPATIENT)
Dept: RADIOLOGY | Facility: HOSPITAL | Age: 70
DRG: 163 | End: 2025-07-24
Payer: COMMERCIAL

## 2025-07-24 ENCOUNTER — ANESTHESIA (INPATIENT)
Dept: RADIOLOGY | Facility: HOSPITAL | Age: 70
DRG: 163 | End: 2025-07-24
Payer: COMMERCIAL

## 2025-07-24 PROBLEM — R73.9 HYPERGLYCEMIA: Status: ACTIVE | Noted: 2025-07-24

## 2025-07-24 PROBLEM — R57.8 OBSTRUCTIVE CARDIOVASCULAR SHOCK (HCC): Status: ACTIVE | Noted: 2025-07-24

## 2025-07-24 LAB
ALBUMIN SERPL BCG-MCNC: 3 G/DL (ref 3.5–5)
ALP SERPL-CCNC: 60 U/L (ref 34–104)
ALT SERPL W P-5'-P-CCNC: 8 U/L (ref 7–52)
ANION GAP SERPL CALCULATED.3IONS-SCNC: 6 MMOL/L (ref 4–13)
AORTIC ROOT: 2.9 CM
AORTIC VALVE MEAN VELOCITY: 9 M/S
APTT PPP: 159 SECONDS (ref 23–34)
APTT PPP: 160 SECONDS (ref 23–34)
APTT PPP: 50 SECONDS (ref 23–34)
APTT PPP: >210 SECONDS (ref 23–34)
ASCENDING AORTA: 3.8 CM
AST SERPL W P-5'-P-CCNC: 22 U/L (ref 13–39)
ATRIAL RATE: 112 BPM
ATRIAL RATE: 123 BPM
AV AREA BY CONTINUOUS VTI: 2.6 CM2
AV AREA PEAK VELOCITY: 2.3 CM2
AV LVOT MEAN GRADIENT: 2 MMHG
AV LVOT PEAK GRADIENT: 4 MMHG
AV MEAN PRESS GRAD SYS DOP V1V2: 4 MMHG
AV ORIFICE AREA US: 2.59 CM2
AV PEAK GRADIENT: 7 MMHG
AV VELOCITY RATIO: 0.83
AV VMAX SYS DOP: 1.34 M/S
BACTERIA UR QL AUTO: ABNORMAL /HPF
BASOPHILS # BLD AUTO: 0.04 THOUSANDS/ÂΜL (ref 0–0.1)
BASOPHILS NFR BLD AUTO: 1 % (ref 0–1)
BILIRUB SERPL-MCNC: 0.57 MG/DL (ref 0.2–1)
BILIRUB UR QL STRIP: NEGATIVE
BSA FOR ECHO PROCEDURE: 2.35 M2
BUN SERPL-MCNC: 12 MG/DL (ref 5–25)
CA-I BLD-SCNC: 1.1 MMOL/L (ref 1.12–1.32)
CALCIUM ALBUM COR SERPL-MCNC: 9.1 MG/DL (ref 8.3–10.1)
CALCIUM SERPL-MCNC: 8.3 MG/DL (ref 8.4–10.2)
CHLORIDE SERPL-SCNC: 109 MMOL/L (ref 96–108)
CLARITY UR: CLEAR
CO2 SERPL-SCNC: 26 MMOL/L (ref 21–32)
COLOR UR: ABNORMAL
CREAT SERPL-MCNC: 0.85 MG/DL (ref 0.6–1.3)
DOP CALC AO VTI: 23.73 CM
DOP CALC LVOT AREA: 3.14 CM2
DOP CALC LVOT CARDIAC INDEX: 2.46 L/MIN/M2
DOP CALC LVOT CARDIAC OUTPUT: 5.79 L/MIN
DOP CALC LVOT DIAMETER: 2 CM
DOP CALC LVOT PEAK VEL VTI: 19.59 CM
DOP CALC LVOT PEAK VEL: 0.99 M/S
DOP CALC LVOT STROKE INDEX: 25.5 ML/M2
DOP CALC LVOT STROKE VOLUME: 61.51
EOSINOPHIL # BLD AUTO: 0.19 THOUSAND/ÂΜL (ref 0–0.61)
EOSINOPHIL NFR BLD AUTO: 3 % (ref 0–6)
ERYTHROCYTE [DISTWIDTH] IN BLOOD BY AUTOMATED COUNT: 14.1 % (ref 11.6–15.1)
FRACTIONAL SHORTENING: 23 (ref 28–44)
GFR SERPL CREATININE-BSD FRML MDRD: 69 ML/MIN/1.73SQ M
GLUCOSE SERPL-MCNC: 122 MG/DL (ref 65–140)
GLUCOSE SERPL-MCNC: 131 MG/DL (ref 65–140)
GLUCOSE SERPL-MCNC: 133 MG/DL (ref 65–140)
GLUCOSE SERPL-MCNC: 135 MG/DL (ref 65–140)
GLUCOSE SERPL-MCNC: 169 MG/DL (ref 65–140)
GLUCOSE UR STRIP-MCNC: NEGATIVE MG/DL
HCT VFR BLD AUTO: 40.4 % (ref 34.8–46.1)
HGB BLD-MCNC: 13.3 G/DL (ref 11.5–15.4)
HGB UR QL STRIP.AUTO: ABNORMAL
IMM GRANULOCYTES # BLD AUTO: 0.03 THOUSAND/UL (ref 0–0.2)
IMM GRANULOCYTES NFR BLD AUTO: 1 % (ref 0–2)
INR PPP: 1.12 (ref 0.85–1.19)
INR PPP: 1.14 (ref 0.85–1.19)
INTERVENTRICULAR SEPTUM IN DIASTOLE (PARASTERNAL SHORT AXIS VIEW): 1.2 CM
INTERVENTRICULAR SEPTUM: 1.2 CM (ref 0.6–1.1)
KETONES UR STRIP-MCNC: ABNORMAL MG/DL
LAAS-AP2: 20.6 CM2
LAAS-AP4: 21 CM2
LEFT ATRIUM SIZE: 3.3 CM
LEFT ATRIUM VOLUME (MOD BIPLANE): 62 ML
LEFT ATRIUM VOLUME INDEX (MOD BIPLANE): 26.4 ML/M2
LEFT INTERNAL DIMENSION IN SYSTOLE: 3 CM (ref 2.1–4)
LEFT VENTRICULAR INTERNAL DIMENSION IN DIASTOLE: 3.9 CM (ref 3.5–6)
LEFT VENTRICULAR POSTERIOR WALL IN END DIASTOLE: 1.2 CM
LEFT VENTRICULAR STROKE VOLUME: 30 ML
LEUKOCYTE ESTERASE UR QL STRIP: NEGATIVE
LV EF US.2D.A4C+ESTIMATED: 56 %
LVSV (TEICH): 30 ML
LYMPHOCYTES # BLD AUTO: 1.43 THOUSANDS/ÂΜL (ref 0.6–4.47)
LYMPHOCYTES NFR BLD AUTO: 22 % (ref 14–44)
MAGNESIUM SERPL-MCNC: 2.2 MG/DL (ref 1.9–2.7)
MCH RBC QN AUTO: 31.4 PG (ref 26.8–34.3)
MCHC RBC AUTO-ENTMCNC: 32.9 G/DL (ref 31.4–37.4)
MCV RBC AUTO: 96 FL (ref 82–98)
MONOCYTES # BLD AUTO: 0.65 THOUSAND/ÂΜL (ref 0.17–1.22)
MONOCYTES NFR BLD AUTO: 10 % (ref 4–12)
MUCOUS THREADS UR QL AUTO: ABNORMAL
NEUTROPHILS # BLD AUTO: 4.31 THOUSANDS/ÂΜL (ref 1.85–7.62)
NEUTS SEG NFR BLD AUTO: 63 % (ref 43–75)
NITRITE UR QL STRIP: NEGATIVE
NON-SQ EPI CELLS URNS QL MICRO: ABNORMAL /HPF
NRBC BLD AUTO-RTO: 0 /100 WBCS
P AXIS: 32 DEGREES
P AXIS: 52 DEGREES
PH UR STRIP.AUTO: 5.5 [PH]
PHOSPHATE SERPL-MCNC: 3.8 MG/DL (ref 2.3–4.1)
PLATELET # BLD AUTO: 167 THOUSANDS/UL (ref 149–390)
PMV BLD AUTO: 11.1 FL (ref 8.9–12.7)
POTASSIUM SERPL-SCNC: 4.4 MMOL/L (ref 3.5–5.3)
PR INTERVAL: 168 MS
PR INTERVAL: 170 MS
PROT SERPL-MCNC: 5.4 G/DL (ref 6.4–8.4)
PROT UR STRIP-MCNC: NEGATIVE MG/DL
PROTHROMBIN TIME: 14.7 SECONDS (ref 12.3–15)
PROTHROMBIN TIME: 14.9 SECONDS (ref 12.3–15)
QRS AXIS: -1 DEGREES
QRS AXIS: 44 DEGREES
QRSD INTERVAL: 74 MS
QRSD INTERVAL: 78 MS
QT INTERVAL: 332 MS
QT INTERVAL: 412 MS
QTC INTERVAL: 453 MS
QTC INTERVAL: 589 MS
RA PRESSURE ESTIMATED: 15 MMHG
RBC # BLD AUTO: 4.23 MILLION/UL (ref 3.81–5.12)
RBC #/AREA URNS AUTO: ABNORMAL /HPF
RIGHT ATRIUM AREA SYSTOLE A4C: 20.4 CM2
RIGHT VENTRICLE ID DIMENSION: 4.5 CM
RV PSP: 45 MMHG
SINOTUBULAR JUNCTION: 2.7 CM
SL CV LEFT ATRIUM LENGTH A2C: 5.5 CM
SL CV LV EF: 55
SL CV PED ECHO LEFT VENTRICLE DIASTOLIC VOLUME (MOD BIPLANE) 2D: 66 ML
SL CV PED ECHO LEFT VENTRICLE SYSTOLIC VOLUME (MOD BIPLANE) 2D: 36 ML
SL CV SINUS OF VALSALVA 2D: 3.3 CM
SODIUM SERPL-SCNC: 141 MMOL/L (ref 135–147)
SP GR UR STRIP.AUTO: 1.03 (ref 1–1.03)
STJ: 2.7 CM
T WAVE AXIS: 15 DEGREES
T WAVE AXIS: 55 DEGREES
TR MAX PG: 30 MMHG
TR PEAK VELOCITY: 2.7 M/S
TRICUSPID ANNULAR PLANE SYSTOLIC EXCURSION: 1.9 CM
TRICUSPID VALVE PEAK REGURGITATION VELOCITY: 2.72 M/S
UROBILINOGEN UR STRIP-ACNC: <2 MG/DL
VENTRICULAR RATE: 112 BPM
VENTRICULAR RATE: 123 BPM
WBC # BLD AUTO: 6.65 THOUSAND/UL (ref 4.31–10.16)
WBC #/AREA URNS AUTO: ABNORMAL /HPF

## 2025-07-24 PROCEDURE — 85347 COAGULATION TIME ACTIVATED: CPT

## 2025-07-24 PROCEDURE — 37184 PRIM ART M-THRMBC 1ST VSL: CPT | Performed by: STUDENT IN AN ORGANIZED HEALTH CARE EDUCATION/TRAINING PROGRAM

## 2025-07-24 PROCEDURE — C1769 GUIDE WIRE: HCPCS | Performed by: STUDENT IN AN ORGANIZED HEALTH CARE EDUCATION/TRAINING PROGRAM

## 2025-07-24 PROCEDURE — C1887 CATHETER, GUIDING: HCPCS | Performed by: STUDENT IN AN ORGANIZED HEALTH CARE EDUCATION/TRAINING PROGRAM

## 2025-07-24 PROCEDURE — 84100 ASSAY OF PHOSPHORUS: CPT | Performed by: STUDENT IN AN ORGANIZED HEALTH CARE EDUCATION/TRAINING PROGRAM

## 2025-07-24 PROCEDURE — C1894 INTRO/SHEATH, NON-LASER: HCPCS | Performed by: STUDENT IN AN ORGANIZED HEALTH CARE EDUCATION/TRAINING PROGRAM

## 2025-07-24 PROCEDURE — 82948 REAGENT STRIP/BLOOD GLUCOSE: CPT

## 2025-07-24 PROCEDURE — C1757 CATH, THROMBECTOMY/EMBOLECT: HCPCS | Performed by: STUDENT IN AN ORGANIZED HEALTH CARE EDUCATION/TRAINING PROGRAM

## 2025-07-24 PROCEDURE — NC001 PR NO CHARGE: Performed by: NURSE PRACTITIONER

## 2025-07-24 PROCEDURE — 85025 COMPLETE CBC W/AUTO DIFF WBC: CPT | Performed by: STUDENT IN AN ORGANIZED HEALTH CARE EDUCATION/TRAINING PROGRAM

## 2025-07-24 PROCEDURE — 80053 COMPREHEN METABOLIC PANEL: CPT | Performed by: STUDENT IN AN ORGANIZED HEALTH CARE EDUCATION/TRAINING PROGRAM

## 2025-07-24 PROCEDURE — 85730 THROMBOPLASTIN TIME PARTIAL: CPT | Performed by: EMERGENCY MEDICINE

## 2025-07-24 PROCEDURE — 93306 TTE W/DOPPLER COMPLETE: CPT

## 2025-07-24 PROCEDURE — 85610 PROTHROMBIN TIME: CPT | Performed by: STUDENT IN AN ORGANIZED HEALTH CARE EDUCATION/TRAINING PROGRAM

## 2025-07-24 PROCEDURE — 02CP3ZZ EXTIRPATION OF MATTER FROM PULMONARY TRUNK, PERCUTANEOUS APPROACH: ICD-10-PCS | Performed by: STUDENT IN AN ORGANIZED HEALTH CARE EDUCATION/TRAINING PROGRAM

## 2025-07-24 PROCEDURE — 93970 EXTREMITY STUDY: CPT | Performed by: SURGERY

## 2025-07-24 PROCEDURE — 81001 URINALYSIS AUTO W/SCOPE: CPT | Performed by: STUDENT IN AN ORGANIZED HEALTH CARE EDUCATION/TRAINING PROGRAM

## 2025-07-24 PROCEDURE — 02CQ3ZZ EXTIRPATION OF MATTER FROM RIGHT PULMONARY ARTERY, PERCUTANEOUS APPROACH: ICD-10-PCS | Performed by: STUDENT IN AN ORGANIZED HEALTH CARE EDUCATION/TRAINING PROGRAM

## 2025-07-24 PROCEDURE — 02CR3ZZ EXTIRPATION OF MATTER FROM LEFT PULMONARY ARTERY, PERCUTANEOUS APPROACH: ICD-10-PCS | Performed by: STUDENT IN AN ORGANIZED HEALTH CARE EDUCATION/TRAINING PROGRAM

## 2025-07-24 PROCEDURE — 36015 PLACE CATHETER IN ARTERY: CPT | Performed by: STUDENT IN AN ORGANIZED HEALTH CARE EDUCATION/TRAINING PROGRAM

## 2025-07-24 PROCEDURE — B31TYZZ FLUOROSCOPY OF LEFT PULMONARY ARTERY USING OTHER CONTRAST: ICD-10-PCS | Performed by: STUDENT IN AN ORGANIZED HEALTH CARE EDUCATION/TRAINING PROGRAM

## 2025-07-24 PROCEDURE — 37185 PRIM ART M-THRMBC SBSQ VSL: CPT

## 2025-07-24 PROCEDURE — 37185 PRIM ART M-THRMBC SBSQ VSL: CPT | Performed by: STUDENT IN AN ORGANIZED HEALTH CARE EDUCATION/TRAINING PROGRAM

## 2025-07-24 PROCEDURE — 94760 N-INVAS EAR/PLS OXIMETRY 1: CPT

## 2025-07-24 PROCEDURE — 99291 CRITICAL CARE FIRST HOUR: CPT | Performed by: INTERNAL MEDICINE

## 2025-07-24 PROCEDURE — 76937 US GUIDE VASCULAR ACCESS: CPT

## 2025-07-24 PROCEDURE — B31SYZZ FLUOROSCOPY OF RIGHT PULMONARY ARTERY USING OTHER CONTRAST: ICD-10-PCS | Performed by: STUDENT IN AN ORGANIZED HEALTH CARE EDUCATION/TRAINING PROGRAM

## 2025-07-24 PROCEDURE — 76937 US GUIDE VASCULAR ACCESS: CPT | Performed by: STUDENT IN AN ORGANIZED HEALTH CARE EDUCATION/TRAINING PROGRAM

## 2025-07-24 PROCEDURE — 93306 TTE W/DOPPLER COMPLETE: CPT | Performed by: INTERNAL MEDICINE

## 2025-07-24 PROCEDURE — 37184 PRIM ART M-THRMBC 1ST VSL: CPT

## 2025-07-24 PROCEDURE — 85730 THROMBOPLASTIN TIME PARTIAL: CPT | Performed by: STUDENT IN AN ORGANIZED HEALTH CARE EDUCATION/TRAINING PROGRAM

## 2025-07-24 PROCEDURE — B31UYZZ FLUOROSCOPY OF PULMONARY TRUNK USING OTHER CONTRAST: ICD-10-PCS | Performed by: STUDENT IN AN ORGANIZED HEALTH CARE EDUCATION/TRAINING PROGRAM

## 2025-07-24 PROCEDURE — 94660 CPAP INITIATION&MGMT: CPT

## 2025-07-24 PROCEDURE — 93010 ELECTROCARDIOGRAM REPORT: CPT | Performed by: INTERNAL MEDICINE

## 2025-07-24 PROCEDURE — 99153 MOD SED SAME PHYS/QHP EA: CPT

## 2025-07-24 PROCEDURE — 93970 EXTREMITY STUDY: CPT

## 2025-07-24 PROCEDURE — 83735 ASSAY OF MAGNESIUM: CPT | Performed by: STUDENT IN AN ORGANIZED HEALTH CARE EDUCATION/TRAINING PROGRAM

## 2025-07-24 PROCEDURE — 85610 PROTHROMBIN TIME: CPT | Performed by: INTERNAL MEDICINE

## 2025-07-24 PROCEDURE — 82330 ASSAY OF CALCIUM: CPT | Performed by: STUDENT IN AN ORGANIZED HEALTH CARE EDUCATION/TRAINING PROGRAM

## 2025-07-24 PROCEDURE — 99152 MOD SED SAME PHYS/QHP 5/>YRS: CPT

## 2025-07-24 PROCEDURE — 85730 THROMBOPLASTIN TIME PARTIAL: CPT | Performed by: INTERNAL MEDICINE

## 2025-07-24 RX ORDER — HEPARIN SODIUM 1000 [USP'U]/ML
INJECTION, SOLUTION INTRAVENOUS; SUBCUTANEOUS AS NEEDED
Status: DISCONTINUED | OUTPATIENT
Start: 2025-07-24 | End: 2025-07-24

## 2025-07-24 RX ORDER — ACETAMINOPHEN 325 MG/1
650 TABLET ORAL EVERY 6 HOURS PRN
Status: DISCONTINUED | OUTPATIENT
Start: 2025-07-24 | End: 2025-07-27 | Stop reason: HOSPADM

## 2025-07-24 RX ORDER — SODIUM CHLORIDE, SODIUM GLUCONATE, SODIUM ACETATE, POTASSIUM CHLORIDE, MAGNESIUM CHLORIDE, SODIUM PHOSPHATE, DIBASIC, AND POTASSIUM PHOSPHATE .53; .5; .37; .037; .03; .012; .00082 G/100ML; G/100ML; G/100ML; G/100ML; G/100ML; G/100ML; G/100ML
1000 INJECTION, SOLUTION INTRAVENOUS ONCE
Status: COMPLETED | OUTPATIENT
Start: 2025-07-24 | End: 2025-07-24

## 2025-07-24 RX ORDER — LIDOCAINE WITH 8.4% SOD BICARB 0.9%(10ML)
SYRINGE (ML) INJECTION AS NEEDED
Status: COMPLETED | OUTPATIENT
Start: 2025-07-24 | End: 2025-07-24

## 2025-07-24 RX ORDER — POLYETHYLENE GLYCOL 3350 17 G/17G
17 POWDER, FOR SOLUTION ORAL DAILY
Status: DISCONTINUED | OUTPATIENT
Start: 2025-07-24 | End: 2025-07-27 | Stop reason: HOSPADM

## 2025-07-24 RX ORDER — SENNOSIDES 8.6 MG
2 TABLET ORAL
Status: DISCONTINUED | OUTPATIENT
Start: 2025-07-24 | End: 2025-07-27 | Stop reason: HOSPADM

## 2025-07-24 RX ORDER — FENTANYL CITRATE 50 UG/ML
INJECTION, SOLUTION INTRAMUSCULAR; INTRAVENOUS AS NEEDED
Status: DISCONTINUED | OUTPATIENT
Start: 2025-07-24 | End: 2025-07-24

## 2025-07-24 RX ORDER — SODIUM CHLORIDE 9 MG/ML
INJECTION, SOLUTION INTRAVENOUS CONTINUOUS PRN
Status: DISCONTINUED | OUTPATIENT
Start: 2025-07-24 | End: 2025-07-24

## 2025-07-24 RX ORDER — FERROUS SULFATE 325(65) MG
325 TABLET ORAL EVERY OTHER DAY
Status: DISCONTINUED | OUTPATIENT
Start: 2025-07-25 | End: 2025-07-27 | Stop reason: HOSPADM

## 2025-07-24 RX ADMIN — DICLOFENAC SODIUM 2 G: 10 GEL TOPICAL at 08:37

## 2025-07-24 RX ADMIN — EPINEPHRINE 2 MCG/MIN: 1 INJECTION INTRAMUSCULAR; INTRAVENOUS; SUBCUTANEOUS at 09:48

## 2025-07-24 RX ADMIN — HEPARIN SODIUM 3000 UNITS: 1000 INJECTION, SOLUTION INTRAVENOUS; SUBCUTANEOUS at 10:52

## 2025-07-24 RX ADMIN — DICLOFENAC SODIUM 2 G: 10 GEL TOPICAL at 17:46

## 2025-07-24 RX ADMIN — FENTANYL CITRATE 25 MCG: 50 INJECTION INTRAMUSCULAR; INTRAVENOUS at 10:22

## 2025-07-24 RX ADMIN — CHOLECALCIFEROL TAB 10 MCG (400 UNIT) 800 UNITS: 10 TAB at 08:37

## 2025-07-24 RX ADMIN — HEPARIN SODIUM 15 UNITS/KG/HR: 10000 INJECTION, SOLUTION INTRAVENOUS at 05:54

## 2025-07-24 RX ADMIN — VITAM B12 100 MCG: 100 TAB at 08:37

## 2025-07-24 RX ADMIN — SODIUM CHLORIDE: 0.9 INJECTION, SOLUTION INTRAVENOUS at 09:40

## 2025-07-24 RX ADMIN — CHLORHEXIDINE GLUCONATE 15 ML: 1.2 SOLUTION ORAL at 20:27

## 2025-07-24 RX ADMIN — DULOXETINE 30 MG: 30 CAPSULE, DELAYED RELEASE ORAL at 08:37

## 2025-07-24 RX ADMIN — INSULIN LISPRO 2 UNITS: 100 INJECTION, SOLUTION INTRAVENOUS; SUBCUTANEOUS at 12:19

## 2025-07-24 RX ADMIN — Medication 10 ML: at 10:26

## 2025-07-24 RX ADMIN — HEPARIN SODIUM 4800 UNITS: 1000 INJECTION, SOLUTION INTRAVENOUS; SUBCUTANEOUS at 20:56

## 2025-07-24 RX ADMIN — PANTOPRAZOLE SODIUM 40 MG: 40 TABLET, DELAYED RELEASE ORAL at 05:54

## 2025-07-24 RX ADMIN — FERROUS SULFATE TAB 325 MG (65 MG ELEMENTAL FE) 325 MG: 325 (65 FE) TAB at 08:37

## 2025-07-24 RX ADMIN — POLYETHYLENE GLYCOL 3350 17 G: 17 POWDER, FOR SOLUTION ORAL at 12:19

## 2025-07-24 RX ADMIN — SODIUM CHLORIDE, SODIUM GLUCONATE, SODIUM ACETATE, POTASSIUM CHLORIDE, MAGNESIUM CHLORIDE, SODIUM PHOSPHATE, DIBASIC, AND POTASSIUM PHOSPHATE 1000 ML: .53; .5; .37; .037; .03; .012; .00082 INJECTION, SOLUTION INTRAVENOUS at 06:30

## 2025-07-24 RX ADMIN — DICLOFENAC SODIUM 2 G: 10 GEL TOPICAL at 12:19

## 2025-07-24 RX ADMIN — FENTANYL CITRATE 25 MCG: 50 INJECTION INTRAMUSCULAR; INTRAVENOUS at 11:23

## 2025-07-24 RX ADMIN — CHLORHEXIDINE GLUCONATE 15 ML: 1.2 SOLUTION ORAL at 08:37

## 2025-07-24 RX ADMIN — Medication 10 ML: at 11:20

## 2025-07-24 RX ADMIN — HEPARIN SODIUM 2000 UNITS: 1000 INJECTION, SOLUTION INTRAVENOUS; SUBCUTANEOUS at 10:58

## 2025-07-24 NOTE — ANESTHESIA POSTPROCEDURE EVALUATION
Post-Op Assessment Note            No anethesia notable event occurred.    Comments: stable at hand off to ICU        Last Filed PACU Vitals:  Vitals Value Taken Time   Temp     Pulse 97 07/24/25 12:54   BP     Resp 19 07/24/25 12:54   SpO2 94 % 07/24/25 12:54   Vitals shown include unfiled device data.

## 2025-07-24 NOTE — ANESTHESIA POSTPROCEDURE EVALUATION
Post-Op Assessment Note    CV Status:  Stable    Pain management: adequate       Mental Status:  Alert   Hydration Status:  Stable   PONV Controlled:  None   Airway Patency:  Patent     Post Op Vitals Reviewed: Yes    No anethesia notable event occurred.    Staff: CRNA           Last Filed PACU Vitals:  Vitals Value Taken Time   Temp     Pulse 108    /62    Resp 16    SpO2 99%

## 2025-07-24 NOTE — ANESTHESIA PROCEDURE NOTES
Arterial Line Insertion    Performed by: Blaise Siegel MD  Authorized by: Blaise Siegel MD  Consent: Verbal consent obtained. Written consent obtained  Risks and benefits: risks, benefits and alternatives were discussed  Consent given by: patient  Patient understanding: patient states understanding of the procedure being performed  Patient consent: the patient's understanding of the procedure matches consent given  Procedure consent: procedure consent matches procedure scheduled  Relevant documents: relevant documents present and verified  Required items: required blood products, implants, devices, and special equipment available  Patient identity confirmed: arm band  Preparation: Patient was prepped and draped in the usual sterile fashion.  Indications: hemodynamic monitoring  Orientation:  Right  Location: brachial artery  Sedation:  Patient sedated: no    Procedure Details:      Line Type: Arterial Line  Needle gauge: 18  Placement technique:  Palpation  Number of attempts: 1    Post-procedure:  Post-procedure: dressing applied  Waveform: good waveform and waveform confirmed  Post-procedure CNS: normal and unchanged  Patient tolerance: patient tolerated the procedure well with no immediate complications  Comments: Pre-procedure

## 2025-07-24 NOTE — ED PROVIDER NOTES
"Time reflects when diagnosis was documented in both MDM as applicable and the Disposition within this note       Time User Action Codes Description Comment    7/23/2025  6:33 PM Jerome Wu Add [R09.02] Hypoxia     7/23/2025  6:33 PM Jerome Wu Add [R06.02] SOB (shortness of breath)     7/23/2025  6:33 PM Jermoe Wu Add [I26.02] Acute saddle pulmonary embolism with acute cor pulmonale (HCC)           ED Disposition       ED Disposition   Transfer to Another Facility-In Network    Condition   --    Date/Time   Wed Jul 23, 2025  6:39 PM    Comment   Edie Crum should be transferred out to [facility].               Assessment & Plan       Medical Decision Making  The patient is a 70-year-old female who presents with a chief complaint of shortness of breath.  The patient started to feel short of breath yesterday worsened today therefore she went to urgent care and was found to be hypoxic.  The patient is a non-smoker no history of COPD asthma.  Patient does not wear oxygen at baseline.  She denies any cough congestion nausea vomiting abdominal pain falls or traumas recent travel. She states that she does have \" water pills\" at home that she uses as needed per PCP for swelling in her legs.  She states she typically has a swollen leg, right greater than left but noticed worsening swelling over the last few days. Denies h/o CAD or CHF    Patient was 80s on room air, required 3-1/2 L nasal cannula oxygen.  Patient's lung sounds were clear did have lower leg edema right worse than left but no redness or pain    EKG was unremarkable for any acute ischemic findings patient was profoundly tachycardic in the 140s.    Lab work demonstrated that the patient had elevated troponin and BNP, CT PE confirmed saddle embolus with heart strain.  Patient's PSI score was 115, class IV, discussed with Dr. Ramos from ICU, recommended PERT alert.  PACs contacted for PERT and discussed case with ICU at [place] in , did confirm and " recommend patient transfer for endovascular procedure      Differential diagnosis included but was not limited to :Pneumonia, Sinusitis, URI, ACS, GERD, PE, chf      Critical Care Time Statement: Upon my evaluation, this patient had a high probability of imminent or life-threatening deterioration due to PE, hypoxia , which required my direct attention, intervention, and personal management.  I spent a total of 60 minutes directly providing critical care services, including interpretation of complex medical databases, evaluating for the presence of life-threatening injuries or illnesses, management of organ system failure(s) , complex medical decision making (to support/prevent further life-threatening deterioration)., and interpretation of hemodynamic data. This time is exclusive of procedures, teaching, treating other patients, family meetings, and any prior time recorded by providers other than myself.        Amount and/or Complexity of Data Reviewed  Labs: ordered. Decision-making details documented in ED Course.  Radiology: ordered and independent interpretation performed. Decision-making details documented in ED Course.  ECG/medicine tests: ordered and independent interpretation performed. Decision-making details documented in ED Course.    Risk  Prescription drug management.        ED Course as of 07/24/25 1349   Wed Jul 23, 2025   1743 hs TnI 0hr(!): 318   1743 Pulse(!): 129  Improved to 109 with IVF    1807 Dr. Surya Ramos with ICU via secure chat notified of pesi 110, patient on 3.5 LNC and is 93%, HR 110s   1810 Radiology right heart strain on CT  LV 1.3 ratio      1836 SLB accepted to critical care,   IR agrees , MICU   Dr. Ellsworth       Medications   sodium chloride 0.9 % bolus 1,000 mL (0 mL Intravenous Stopped 7/23/25 1814)   iohexol (OMNIPAQUE) 350 MG/ML injection (MULTI-DOSE) 85 mL (85 mL Intravenous Given 7/23/25 1751)   heparin (porcine) injection 9,600 Units (9,600 Units Intravenous Given  25 1842)       ED Risk Strat Scores                    No data recorded        SBIRT 20yo+      Flowsheet Row Most Recent Value   Initial Alcohol Screen: US AUDIT-C     1. How often do you have a drink containing alcohol? 0 Filed at: 2025   2. How many drinks containing alcohol do you have on a typical day you are drinking?  0 Filed at: 2025   3a. Male UNDER 65: How often do you have five or more drinks on one occasion? 0 Filed at: 2025   3b. FEMALE Any Age, or MALE 65+: How often do you have 4 or more drinks on one occassion? 0 Filed at: 2025   Audit-C Score 0 Filed at: 2025   MICAELA: How many times in the past year have you...    Used an illegal drug or used a prescription medication for non-medical reasons? Never Filed at: 2025                  PESI  Age: 70 years old  Sex: 0  History of Cancer: 0  History of Heart Failure: 0  History of Chronic Lung Disease: 0  Heart rate greater than or equal to 110: 20  Systolic BP < 100 mmH  Respiratory rate greater than or equal to 30: 0  Temperature <36°C/96.8°F: 0  Altered Mental Status (Disorientation, lethargy, stupor, or coma): 0  O2 saturation <90%: 20  PESI Score Results: 110             History of Present Illness       Chief Complaint   Patient presents with    Shortness of Breath     Pt sob x 2 days and was at  today and found to be at 80% RA, EMS called 80% on 2L and placed on 4L.       Past Medical History[1]   Past Surgical History[2]   Family History[3]   Social History[4]   E-Cigarette/Vaping    E-Cigarette Use Never User       E-Cigarette/Vaping Substances      I have reviewed and agree with the history as documented.     The patient is a 70-year-old female who presents with a chief complaint of shortness of breath.  The patient started to feel short of breath yesterday worsened today therefore she went to urgent care and was found to be hypoxic.  The patient is a non-smoker no history  "of COPD asthma.  Patient does not wear oxygen at baseline.  She denies any cough congestion nausea vomiting abdominal pain falls or traumas recent travel. She states that she does have \" water pills\" at home that she uses as needed per PCP for swelling in her legs.  She states she typically has a swollen leg, right greater than left but noticed worsening swelling over the last few days. Denies h/o CAD or CHF      Shortness of Breath      Review of Systems   Respiratory:  Positive for shortness of breath.    All other systems reviewed and are negative.          Objective       ED Triage Vitals   Temperature Pulse Blood Pressure Respirations SpO2 Patient Position - Orthostatic VS   07/23/25 1639 07/23/25 1639 07/23/25 1639 07/23/25 1639 07/23/25 1639 07/23/25 1900   97.6 °F (36.4 °C) (!) 129 (!) 172/88 (!) 24 (!) 88 % Lying      Temp Source Heart Rate Source BP Location FiO2 (%) Pain Score    07/23/25 1639 07/23/25 1639 07/23/25 1900 -- 07/23/25 1639    Tympanic Monitor Right arm  No Pain      Vitals      Date and Time Temp Pulse SpO2 Resp BP Pain Score FACES Pain Rating User   07/23/25 1915 -- 111 94 % 25 136/88 -- -- AD   07/23/25 1900 -- 110 95 % 22 140/89 -- -- NM   07/23/25 1800 -- 111 92 % 21 118/82 -- -- AD   07/23/25 1639 97.6 °F (36.4 °C) 129 88 % 24 172/88 No Pain -- LAK            Physical Exam  Vitals and nursing note reviewed.   Constitutional:       General: She is in acute distress.      Appearance: She is well-developed.   HENT:      Head: Normocephalic and atraumatic.      Right Ear: External ear normal.      Left Ear: External ear normal.     Eyes:      Extraocular Movements: Extraocular movements intact.      Pupils: Pupils are equal, round, and reactive to light.       Cardiovascular:      Rate and Rhythm: Regular rhythm. Tachycardia present.      Heart sounds: No murmur heard.  Pulmonary:      Effort: Pulmonary effort is normal. Tachypnea present. No respiratory distress.      Breath sounds: " Normal breath sounds. No wheezing.   Chest:      Chest wall: No tenderness.   Abdominal:      General: Bowel sounds are normal.      Palpations: Abdomen is soft. There is no mass.      Tenderness: There is no abdominal tenderness. There is no rebound.      Hernia: No hernia is present.     Musculoskeletal:      Cervical back: Normal range of motion and neck supple.      Comments: Right leg is  more swollen than left no pitting edema no redness     Skin:     General: Skin is warm and dry.      Capillary Refill: Capillary refill takes less than 2 seconds.     Neurological:      General: No focal deficit present.      Mental Status: She is alert and oriented to person, place, and time.      Coordination: Coordination normal.     Psychiatric:         Behavior: Behavior normal.         Results Reviewed       Procedure Component Value Units Date/Time    Blood culture #1 [[id number]] Collected: 07/23/25 1656    Lab Status: Preliminary result Specimen: Blood from Arm, Left Updated: 07/24/25 0002     Blood Culture Received in Microbiology Lab. Culture in Progress.    Blood culture #2 [[id number]] Collected: 07/23/25 1653    Lab Status: Preliminary result Specimen: Blood from Arm, Left Updated: 07/24/25 0002     Blood Culture Received in Microbiology Lab. Culture in Progress.    Lactic acid 2 Hours [[id number]]  (Normal) Collected: 07/23/25 1909    Lab Status: Final result Specimen: Blood from Arm, Left Updated: 07/23/25 1952     LACTIC ACID 1.9 mmol/L     Narrative:      Result may be elevated if tourniquet was used during collection.    Urine Microscopic [[id number]]  (Abnormal) Collected: 07/23/25 1909    Lab Status: Final result Specimen: Urine, Clean Catch Updated: 07/23/25 1930     RBC, UA None Seen /hpf      WBC, UA 10-20 /hpf      Epithelial Cells Occasional /hpf      Bacteria, UA Moderate /hpf      Hyaline Casts, UA 0-1 /lpf      Ca Oxalate Mariia, UA Occasional /hpf      MUCUS THREADS Occasional    UA w Reflex to  Microscopic w Reflex to Culture [[id number]]  (Abnormal) Collected: 07/23/25 1909    Lab Status: Final result Specimen: Urine, Clean Catch Updated: 07/23/25 1918     Color, UA Yellow     Clarity, UA Cloudy     Specific Gravity, UA 1.010     pH, UA 5.5     Leukocytes, UA Small     Nitrite, UA Positive     Protein, UA Negative mg/dl      Glucose, UA Negative mg/dl      Ketones, UA Negative mg/dl      Urobilinogen, UA 0.2 E.U./dl      Bilirubin, UA Negative     Occult Blood, UA Negative    HS Troponin I 2hr [[id number]]  (Abnormal) Collected: 07/23/25 1815    Lab Status: Final result Specimen: Blood from Hand, Right Updated: 07/23/25 1851     hs TnI 2hr 312 ng/L      Delta 2hr hsTnI -6 ng/L     B-Type Natriuretic Peptide(BNP) [[id number]]  (Abnormal) Collected: 07/23/25 1653    Lab Status: Final result Specimen: Blood from Arm, Left Updated: 07/23/25 1811      pg/mL     CBC and differential [[id number]]  (Abnormal) Collected: 07/23/25 1653    Lab Status: Final result Specimen: Blood from Arm, Left Updated: 07/23/25 1745     WBC 9.49 Thousand/uL      RBC 5.00 Million/uL      Hemoglobin 15.6 g/dL      Hematocrit 47.1 %      MCV 94 fL      MCH 31.2 pg      MCHC 33.1 g/dL      RDW 13.8 %      MPV 11.3 fL      Platelets 192 Thousands/uL      nRBC 0 /100 WBCs      Segmented % 82 %      Immature Grans % 0 %      Lymphocytes % 10 %      Monocytes % 7 %      Eosinophils Relative 1 %      Basophils Relative 0 %      Absolute Neutrophils 7.72 Thousands/µL      Absolute Immature Grans 0.03 Thousand/uL      Absolute Lymphocytes 0.96 Thousands/µL      Absolute Monocytes 0.67 Thousand/µL      Eosinophils Absolute 0.08 Thousand/µL      Basophils Absolute 0.03 Thousands/µL     Procalcitonin [[id number]]  (Normal) Collected: 07/23/25 1653    Lab Status: Final result Specimen: Blood from Arm, Left Updated: 07/23/25 1737     Procalcitonin <0.05 ng/ml     HS Troponin 0hr (reflex protocol) [[id number]]  (Abnormal) Collected: 07/23/25 1653     Lab Status: Final result Specimen: Blood from Arm, Left Updated: 07/23/25 1736     hs TnI 0hr 318 ng/L     Comprehensive metabolic panel [[id number]] Collected: 07/23/25 1706    Lab Status: Final result Specimen: Blood Updated: 07/23/25 1731     Sodium 141 mmol/L      Potassium 3.6 mmol/L      Chloride 108 mmol/L      CO2 23 mmol/L      ANION GAP 10 mmol/L      BUN 17 mg/dL      Creatinine 1.11 mg/dL      Glucose 140 mg/dL      Calcium 9.1 mg/dL      AST 17 U/L      ALT 10 U/L      Alkaline Phosphatase 77 U/L      Total Protein 6.4 g/dL      Albumin 3.7 g/dL      Total Bilirubin 0.62 mg/dL      eGFR 50 ml/min/1.73sq m     Narrative:      National Kidney Disease Foundation guidelines for Chronic Kidney Disease (CKD):     Stage 1 with normal or high GFR (GFR > 90 mL/min/1.73 square meters)    Stage 2 Mild CKD (GFR = 60-89 mL/min/1.73 square meters)    Stage 3A Moderate CKD (GFR = 45-59 mL/min/1.73 square meters)    Stage 3B Moderate CKD (GFR = 30-44 mL/min/1.73 square meters)    Stage 4 Severe CKD (GFR = 15-29 mL/min/1.73 square meters)    Stage 5 End Stage CKD (GFR <15 mL/min/1.73 square meters)  Note: GFR calculation is accurate only with a steady state creatinine    Lactic acid [[id number]]  (Abnormal) Collected: 07/23/25 1653    Lab Status: Final result Specimen: Blood from Arm, Left Updated: 07/23/25 1731     LACTIC ACID 2.7 mmol/L     Narrative:      Result may be elevated if tourniquet was used during collection.    Magnesium [[id number]]  (Normal) Collected: 07/23/25 1706    Lab Status: Final result Specimen: Blood Updated: 07/23/25 1731     Magnesium 2.0 mg/dL     Protime-INR [[id number]]  (Normal) Collected: 07/23/25 1653    Lab Status: Final result Specimen: Blood from Arm, Left Updated: 07/23/25 1723     Protime 13.7 seconds      INR 1.00    Narrative:      INR Therapeutic Range    Indication                                             INR Range      Atrial Fibrillation                                                2.0-3.0  Hypercoagulable State                                    2.0.2.3  Left Ventricular Asist Device                            2.0-3.0  Mechanical Heart Valve                                  -    Aortic(with afib, MI, embolism, HF, LA enlargement,    and/or coagulopathy)                                     2.0-3.0 (2.5-3.5)     Mitral                                                             2.5-3.5  Prosthetic/Bioprosthetic Heart Valve               2.0-3.0  Venous thromboembolism (VTE: VT, PE        2.0-3.0    APTT [[id number]]  (Normal) Collected: 07/23/25 1653    Lab Status: Final result Specimen: Blood from Arm, Left Updated: 07/23/25 1723     PTT 32 seconds     Blood gas, Venous [[id number]]  (Abnormal) Collected: 07/23/25 1653    Lab Status: Final result Specimen: Blood from Arm, Left Updated: 07/23/25 1706     pH, Ren 7.389     pCO2, Ren 36.1 mm Hg      pO2, Ren 48.8 mm Hg      HCO3, Ren 21.3 mmol/L      Base Excess, Ren -2.9 mmol/L      O2 Content, Ren 19.3 ml/dL      O2 HGB, VENOUS 82.3 %     FLU/COVID Rapid Antigen (30 min. TAT) - Preferred screening test in ED [[id number]]  (Normal) Collected: 07/23/25 1643    Lab Status: Final result Specimen: Nares from Nose Updated: 07/23/25 1704     SARS COV Rapid Antigen Negative     Influenza A Rapid Antigen Negative     Influenza B Rapid Antigen Negative    Narrative:      This test has been performed using the Quidel Dulce 2 FLU+SARS Antigen test under the Emergency Use Authorization (EUA). This test has been validated by the  and verified by the performing laboratory. The Dulce uses lateral flow immunofluorescent sandwich assay to detect SARS-COV, Influenza A and Influenza B Antigen.     The Quidel Dulce 2 SARS Antigen test does not differentiate between SARS-CoV and SARS-CoV-2.     Negative results are presumptive and may be confirmed with a molecular assay, if necessary, for patient management. Negative results do not rule out  "SARS-CoV-2 or influenza infection and should not be used as the sole basis for treatment or patient management decisions. A negative test result may occur if the level of antigen in a sample is below the limit of detection of this test.     Positive results are indicative of the presence of viral antigens, but do not rule out bacterial infection or co-infection with other viruses.     All test results should be used as an adjunct to clinical observations and other information available to the provider.    FOR PEDIATRIC PATIENTS - copy/paste COVID Guidelines URL to browser: [email or web address]            CTA chest pe study   Final Interpretation by Bora Aguilar MD (07/23 1830)      Saddle pulmonary embolus with extensive clot burden and right heart strain.      The calculated ratio of right ventricular to left ventricular diameter (RV/LV ratio) is 1.3      There is a critical finding of acute PE with RV/LV ratio >0.9. Based on PERT algorithm recommendations:    \"  Order STAT biomarkers including troponin, NT-BNP or BNP    \"  Calculate PESI score:   o  If PESI score falls in I-III, with negative biomarkers, please order a PERT priority ECHO.   o  If PESI score falls in IV-V or with positive biomarkers, please order STAT ECHO and alert the [facility] PERT via PAC at [phone].      Few pulmonary nodules measuring up to 5 mm. Based on current Fleischner Society 2017 Guidelines on incidental pulmonary nodule, no routine follow-up is needed if the patient is low risk.  If the patient is high risk, optional follow-up chest CT at 12    months can be considered.            Dr. Martinez discussed this study with IVSHNU HUMPHREYS on 7/23/2025 6: PM.         Computerized Assisted Algorithm (CAA) may have aided analysis of applicable images.      Resident: Boby Martinez I, the attending radiologist, have reviewed the images and agree with the final " report above.      Workstation performed: [id number]         XR chest 1 view portable   Final Interpretation by Aleksandr Burton MD (07/24 0934)      No consolidation, pneumothorax or pleural effusion.            Resident: CARLA BURROUGHS I, the attending radiologist, have reviewed the images and agree with the final report above.      Workstation performed: [id number]             Procedures    ED Medication and Procedure Management   Prior to Admission Medications   Prescriptions Last Dose Informant Patient Reported? Taking?   Cyanocobalamin (VITAMIN B-12 PO)   Yes No   Sig: Take by mouth   DULoxetine (CYMBALTA) 30 mg delayed release capsule   Yes No   Sig: Take 1 capsule by mouth in the morning   Diclofenac Sodium (VOLTAREN) 1 %   No No   Sig: Apply 2 g topically 4 (four) times a day   Ferrous Sulfate (IRON PO)   Yes No   Sig: Take 1 tablet by mouth daily   IRON-VITAMIN C PO   Yes No   Sig: Take by mouth   Multiple Minerals-Vitamins (CALCIUM CITRATE PLUS PO)   Yes No   Sig: Take by mouth   VITAMIN D PO   Yes No   Sig: Take by mouth   citalopram (CeleXA) 20 mg tablet   Yes No   Sig: Take 1 tablet by mouth in the morning and 1 tablet before bedtime.   losartan-hydrochlorothiazide (HYZAAR) 50-12.5 mg per tablet   Yes No   Sig: Take 1 tablet by mouth in the morning   pantoprazole (PROTONIX) 40 mg tablet   Yes No   Sig: Take 1 tablet by mouth in the morning.   torsemide (DEMADEX) 5 MG tablet   Yes No   Sig: prn      Facility-Administered Medications: None     Discharge Medication List as of 7/23/2025  7:21 PM        CONTINUE these medications which have NOT CHANGED    Details   citalopram (CeleXA) 20 mg tablet Take 1 tablet by mouth in the morning and 1 tablet before bedtime., Starting Mon 3/15/2021, Historical Med      Cyanocobalamin (VITAMIN B-12 PO) Take by mouth, Historical Med      Diclofenac Sodium (VOLTAREN) 1 % Apply 2 g topically 4 (four) times a day, Starting Tue 9/6/2022, Normal      DULoxetine  (CYMBALTA) 30 mg delayed release capsule Take 1 capsule by mouth in the morning, Starting Sat 5/10/2025, Historical Med      Ferrous Sulfate (IRON PO) Take 1 tablet by mouth daily, Historical Med      IRON-VITAMIN C PO Take by mouth, Historical Med      losartan-hydrochlorothiazide (HYZAAR) 50-12.5 mg per tablet Take 1 tablet by mouth in the morning, Starting Fri 2/25/2022, Historical Med      Multiple Minerals-Vitamins (CALCIUM CITRATE PLUS PO) Take by mouth, Historical Med      pantoprazole (PROTONIX) 40 mg tablet Take 1 tablet by mouth in the morning., Starting Thu 2/11/2021, Historical Med      torsemide (DEMADEX) 5 MG tablet prn, Historical Med      VITAMIN D PO Take by mouth, Historical Med           No discharge procedures on file.  ED SEPSIS DOCUMENTATION   Time reflects when diagnosis was documented in both MDM as applicable and the Disposition within this note       Time User Action Codes Description Comment    7/23/2025  6:33 PM Jerome Wu Add [R09.02] Hypoxia     7/23/2025  6:33 PM Jerome Wu Add [R06.02] SOB (shortness of breath)     7/23/2025  6:33 PM Jerome Wu Add [I26.02] Acute saddle pulmonary embolism with acute cor pulmonale (HCC)                    [1]   Past Medical History:  Diagnosis Date    Anxiety     Arthritis     knees and feet    Depression     GERD (gastroesophageal reflux disease)     Hypertension     Sciatica     Stomach ulcer    [2]   Past Surgical History:  Procedure Laterality Date    APPENDECTOMY      CHOLECYSTECTOMY      GASTRIC BYPASS      HERNIA REPAIR      TX INJECT SI JOINT ARTHRGRPHY&/ANES/STEROID W/KASIA Right 11/22/2022    Procedure: BLOCK / INJECTION SACROILIAC;  Surgeon: Jayy Castano MD;  Location: [facility];  Service: Pain Management    [3]   Family History  Problem Relation Name Age of Onset    Hypertension Mother      Heart attack Mother      Stroke Mother      Hypertension Father      Heart attack Father      No Known Problems Maternal Grandmother      No  Known Problems Maternal Grandfather      No Known Problems Paternal Grandmother      No Known Problems Paternal Grandfather      Cancer Maternal Aunt          bladder cancer    No Known Problems Paternal Aunt      No Known Problems Paternal Aunt     [4]   Social History  Tobacco Use    Smoking status: Never    Smokeless tobacco: Never   Vaping Use    Vaping status: Never Used   Substance Use Topics    Alcohol use: Yes     Comment: rarely    Drug use: Never        Jerome Wu PA-C  07/24/25 [id number]

## 2025-07-24 NOTE — ANESTHESIA PREPROCEDURE EVALUATION
Procedure:  IR PE ENDOVASCULAR THERAPY    Relevant Problems   CARDIO   (+) Acute saddle pulmonary embolism with acute cor pulmonale (HCC)   (+) Hypertension      GI/HEPATIC   (+) GERD (gastroesophageal reflux disease)      /RENAL   (+) Chronic kidney disease, stage 3a (HCC)      MUSCULOSKELETAL   (+) Adhesive capsulitis of right shoulder   (+) Chronic right-sided low back pain without sciatica   (+) Primary osteoarthritis of right knee      NEURO/PSYCH   (+) Chronic right shoulder pain   (+) Chronic right-sided low back pain without sciatica   (+) Current mild episode of major depressive disorder without prior episode (HCC)        Physical Exam    Airway     Mallampati score: II  TM Distance: >3 FB  Neck ROM: full      Cardiovascular  Rhythm: regular, Rate: normal    Dental       Pulmonary   Breath sounds clear to auscultation    Neurological      Other Findings  post-pubertal.    Stomach ulcer    Depression    Anxiety    Sciatica    Arthritis knees and feet   Hypertension    GERD (gastroesophageal reflux disease)        Anesthesia Plan  ASA Score- 4 Emergent    Anesthesia Type- IV sedation with anesthesia with ASA Monitors.         Additional Monitors:     Airway Plan:            Plan Factors-Exercise tolerance (METS): >4 METS.    Chart reviewed. EKG reviewed. Imaging results reviewed. Existing labs reviewed. Patient summary reviewed.                  Induction- intravenous.    Postoperative Plan- .   Monitoring Plan - Monitoring plan - standard ASA monitoring      Perioperative Resuscitation Plan - Level 1 - Full Code.       Informed Consent- Anesthetic plan and risks discussed with patient.  I personally reviewed this patient with the CRNA. Discussed and agreed on the Anesthesia Plan with the CRNA..      NPO Status:  No vitals data found for the desired time range.

## 2025-07-25 ENCOUNTER — APPOINTMENT (INPATIENT)
Dept: NON INVASIVE DIAGNOSTICS | Facility: HOSPITAL | Age: 70
DRG: 163 | End: 2025-07-25
Payer: COMMERCIAL

## 2025-07-25 LAB
ANION GAP SERPL CALCULATED.3IONS-SCNC: 7 MMOL/L (ref 4–13)
APTT PPP: 131 SECONDS (ref 23–34)
ATRIAL RATE: 125 BPM
BSA FOR ECHO PROCEDURE: 2.35 M2
BUN SERPL-MCNC: 11 MG/DL (ref 5–25)
CALCIUM SERPL-MCNC: 8.2 MG/DL (ref 8.4–10.2)
CHLORIDE SERPL-SCNC: 107 MMOL/L (ref 96–108)
CO2 SERPL-SCNC: 28 MMOL/L (ref 21–32)
CREAT SERPL-MCNC: 0.86 MG/DL (ref 0.6–1.3)
ERYTHROCYTE [DISTWIDTH] IN BLOOD BY AUTOMATED COUNT: 14.9 % (ref 11.6–15.1)
GFR SERPL CREATININE-BSD FRML MDRD: 68 ML/MIN/1.73SQ M
GLUCOSE SERPL-MCNC: 112 MG/DL (ref 65–140)
GLUCOSE SERPL-MCNC: 113 MG/DL (ref 65–140)
GLUCOSE SERPL-MCNC: 113 MG/DL (ref 65–140)
GLUCOSE SERPL-MCNC: 122 MG/DL (ref 65–140)
GLUCOSE SERPL-MCNC: 122 MG/DL (ref 65–140)
GLUCOSE SERPL-MCNC: 127 MG/DL (ref 65–140)
HCT VFR BLD AUTO: 35.1 % (ref 34.8–46.1)
HGB BLD-MCNC: 12 G/DL (ref 11.5–15.4)
INTERVENTRICULAR SEPTUM IN DIASTOLE (PARASTERNAL SHORT AXIS VIEW): 1.1 CM
LEFT VENTRICULAR INTERNAL DIMENSION IN DIASTOLE: 4.05 CM (ref 3.5–6)
LEFT VENTRICULAR POSTERIOR WALL IN END DIASTOLE: 1.1 CM
LV EF US.2D.A4C+ESTIMATED: 58 %
MAGNESIUM SERPL-MCNC: 2.1 MG/DL (ref 1.9–2.7)
MCH RBC QN AUTO: 34 PG (ref 26.8–34.3)
MCHC RBC AUTO-ENTMCNC: 34.2 G/DL (ref 31.4–37.4)
MCV RBC AUTO: 99 FL (ref 82–98)
MRSA NOSE QL CULT: NORMAL
P AXIS: 24 DEGREES
PHOSPHATE SERPL-MCNC: 4 MG/DL (ref 2.3–4.1)
PLATELET # BLD AUTO: 163 THOUSANDS/UL (ref 149–390)
PMV BLD AUTO: 11.1 FL (ref 8.9–12.7)
POTASSIUM SERPL-SCNC: 3.5 MMOL/L (ref 3.5–5.3)
PR INTERVAL: 148 MS
QRS AXIS: 9 DEGREES
QRSD INTERVAL: 74 MS
QT INTERVAL: 318 MS
QTC INTERVAL: 459 MS
RBC # BLD AUTO: 3.53 MILLION/UL (ref 3.81–5.12)
RIGHT VENTRICLE ID DIMENSION: 4.1 CM
SL CV LV EF: 60
SODIUM SERPL-SCNC: 142 MMOL/L (ref 135–147)
T WAVE AXIS: 49 DEGREES
TR MAX PG: 24 MMHG
TR PEAK VELOCITY: 2.5 M/S
TRICUSPID ANNULAR PLANE SYSTOLIC EXCURSION: 2.1 CM
TRICUSPID VALVE PEAK REGURGITATION VELOCITY: 2.45 M/S
VENTRICULAR RATE: 125 BPM
WBC # BLD AUTO: 5.98 THOUSAND/UL (ref 4.31–10.16)

## 2025-07-25 PROCEDURE — 82948 REAGENT STRIP/BLOOD GLUCOSE: CPT

## 2025-07-25 PROCEDURE — 94660 CPAP INITIATION&MGMT: CPT

## 2025-07-25 PROCEDURE — 80048 BASIC METABOLIC PNL TOTAL CA: CPT | Performed by: STUDENT IN AN ORGANIZED HEALTH CARE EDUCATION/TRAINING PROGRAM

## 2025-07-25 PROCEDURE — 84100 ASSAY OF PHOSPHORUS: CPT | Performed by: STUDENT IN AN ORGANIZED HEALTH CARE EDUCATION/TRAINING PROGRAM

## 2025-07-25 PROCEDURE — 85027 COMPLETE CBC AUTOMATED: CPT | Performed by: STUDENT IN AN ORGANIZED HEALTH CARE EDUCATION/TRAINING PROGRAM

## 2025-07-25 PROCEDURE — 93308 TTE F-UP OR LMTD: CPT

## 2025-07-25 PROCEDURE — 85730 THROMBOPLASTIN TIME PARTIAL: CPT | Performed by: STUDENT IN AN ORGANIZED HEALTH CARE EDUCATION/TRAINING PROGRAM

## 2025-07-25 PROCEDURE — 93325 DOPPLER ECHO COLOR FLOW MAPG: CPT | Performed by: INTERNAL MEDICINE

## 2025-07-25 PROCEDURE — 99232 SBSQ HOSP IP/OBS MODERATE 35: CPT | Performed by: NURSE PRACTITIONER

## 2025-07-25 PROCEDURE — 94760 N-INVAS EAR/PLS OXIMETRY 1: CPT

## 2025-07-25 PROCEDURE — 93308 TTE F-UP OR LMTD: CPT | Performed by: INTERNAL MEDICINE

## 2025-07-25 PROCEDURE — 99232 SBSQ HOSP IP/OBS MODERATE 35: CPT | Performed by: INTERNAL MEDICINE

## 2025-07-25 PROCEDURE — 93325 DOPPLER ECHO COLOR FLOW MAPG: CPT

## 2025-07-25 PROCEDURE — NC001 PR NO CHARGE: Performed by: INTERNAL MEDICINE

## 2025-07-25 PROCEDURE — 97163 PT EVAL HIGH COMPLEX 45 MIN: CPT

## 2025-07-25 PROCEDURE — 93321 DOPPLER ECHO F-UP/LMTD STD: CPT | Performed by: INTERNAL MEDICINE

## 2025-07-25 PROCEDURE — 83735 ASSAY OF MAGNESIUM: CPT | Performed by: STUDENT IN AN ORGANIZED HEALTH CARE EDUCATION/TRAINING PROGRAM

## 2025-07-25 PROCEDURE — 93321 DOPPLER ECHO F-UP/LMTD STD: CPT

## 2025-07-25 PROCEDURE — 97167 OT EVAL HIGH COMPLEX 60 MIN: CPT

## 2025-07-25 RX ORDER — POTASSIUM CHLORIDE 1500 MG/1
40 TABLET, EXTENDED RELEASE ORAL ONCE
Status: COMPLETED | OUTPATIENT
Start: 2025-07-25 | End: 2025-07-25

## 2025-07-25 RX ORDER — INSULIN LISPRO 100 [IU]/ML
2-12 INJECTION, SOLUTION INTRAVENOUS; SUBCUTANEOUS
Status: DISCONTINUED | OUTPATIENT
Start: 2025-07-25 | End: 2025-07-27 | Stop reason: HOSPADM

## 2025-07-25 RX ORDER — MAGNESIUM HYDROXIDE/ALUMINUM HYDROXICE/SIMETHICONE 120; 1200; 1200 MG/30ML; MG/30ML; MG/30ML
30 SUSPENSION ORAL EVERY 4 HOURS PRN
Status: DISCONTINUED | OUTPATIENT
Start: 2025-07-25 | End: 2025-07-27 | Stop reason: HOSPADM

## 2025-07-25 RX ORDER — INSULIN LISPRO 100 [IU]/ML
2-12 INJECTION, SOLUTION INTRAVENOUS; SUBCUTANEOUS
Status: DISCONTINUED | OUTPATIENT
Start: 2025-07-26 | End: 2025-07-27 | Stop reason: HOSPADM

## 2025-07-25 RX ADMIN — FERROUS SULFATE TAB 325 MG (65 MG ELEMENTAL FE) 325 MG: 325 (65 FE) TAB at 08:48

## 2025-07-25 RX ADMIN — APIXABAN 10 MG: 5 TABLET, FILM COATED ORAL at 08:48

## 2025-07-25 RX ADMIN — POLYETHYLENE GLYCOL 3350 17 G: 17 POWDER, FOR SOLUTION ORAL at 08:49

## 2025-07-25 RX ADMIN — CHLORHEXIDINE GLUCONATE 15 ML: 1.2 SOLUTION ORAL at 08:48

## 2025-07-25 RX ADMIN — VITAM B12 100 MCG: 100 TAB at 08:49

## 2025-07-25 RX ADMIN — HEPARIN SODIUM 14 UNITS/KG/HR: 10000 INJECTION, SOLUTION INTRAVENOUS at 00:03

## 2025-07-25 RX ADMIN — DICLOFENAC SODIUM 2 G: 10 GEL TOPICAL at 12:46

## 2025-07-25 RX ADMIN — CHOLECALCIFEROL TAB 10 MCG (400 UNIT) 800 UNITS: 10 TAB at 08:49

## 2025-07-25 RX ADMIN — DULOXETINE 30 MG: 30 CAPSULE, DELAYED RELEASE ORAL at 08:49

## 2025-07-25 RX ADMIN — DICLOFENAC SODIUM 2 G: 10 GEL TOPICAL at 08:49

## 2025-07-25 RX ADMIN — PANTOPRAZOLE SODIUM 40 MG: 40 TABLET, DELAYED RELEASE ORAL at 05:50

## 2025-07-25 RX ADMIN — POTASSIUM CHLORIDE 40 MEQ: 1500 TABLET, EXTENDED RELEASE ORAL at 07:04

## 2025-07-25 RX ADMIN — APIXABAN 10 MG: 5 TABLET, FILM COATED ORAL at 18:16

## 2025-07-26 LAB
ANION GAP SERPL CALCULATED.3IONS-SCNC: 7 MMOL/L (ref 4–13)
BASOPHILS # BLD AUTO: 0.03 THOUSANDS/ÂΜL (ref 0–0.1)
BASOPHILS NFR BLD AUTO: 1 % (ref 0–1)
BUN SERPL-MCNC: 11 MG/DL (ref 5–25)
CALCIUM SERPL-MCNC: 8.4 MG/DL (ref 8.4–10.2)
CHLORIDE SERPL-SCNC: 107 MMOL/L (ref 96–108)
CO2 SERPL-SCNC: 27 MMOL/L (ref 21–32)
CREAT SERPL-MCNC: 0.79 MG/DL (ref 0.6–1.3)
EOSINOPHIL # BLD AUTO: 0.22 THOUSAND/ÂΜL (ref 0–0.61)
EOSINOPHIL NFR BLD AUTO: 4 % (ref 0–6)
ERYTHROCYTE [DISTWIDTH] IN BLOOD BY AUTOMATED COUNT: 14.8 % (ref 11.6–15.1)
GFR SERPL CREATININE-BSD FRML MDRD: 76 ML/MIN/1.73SQ M
GLUCOSE SERPL-MCNC: 102 MG/DL (ref 65–140)
GLUCOSE SERPL-MCNC: 108 MG/DL (ref 65–140)
GLUCOSE SERPL-MCNC: 111 MG/DL (ref 65–140)
GLUCOSE SERPL-MCNC: 124 MG/DL (ref 65–140)
GLUCOSE SERPL-MCNC: 128 MG/DL (ref 65–140)
HCT VFR BLD AUTO: 34.4 % (ref 34.8–46.1)
HGB BLD-MCNC: 11.5 G/DL (ref 11.5–15.4)
IMM GRANULOCYTES # BLD AUTO: 0.01 THOUSAND/UL (ref 0–0.2)
IMM GRANULOCYTES NFR BLD AUTO: 0 % (ref 0–2)
LYMPHOCYTES # BLD AUTO: 0.97 THOUSANDS/ÂΜL (ref 0.6–4.47)
LYMPHOCYTES NFR BLD AUTO: 19 % (ref 14–44)
MAGNESIUM SERPL-MCNC: 2.1 MG/DL (ref 1.9–2.7)
MCH RBC QN AUTO: 33.5 PG (ref 26.8–34.3)
MCHC RBC AUTO-ENTMCNC: 33.4 G/DL (ref 31.4–37.4)
MCV RBC AUTO: 100 FL (ref 82–98)
MONOCYTES # BLD AUTO: 0.5 THOUSAND/ÂΜL (ref 0.17–1.22)
MONOCYTES NFR BLD AUTO: 10 % (ref 4–12)
NEUTROPHILS # BLD AUTO: 3.47 THOUSANDS/ÂΜL (ref 1.85–7.62)
NEUTS SEG NFR BLD AUTO: 66 % (ref 43–75)
NRBC BLD AUTO-RTO: 0 /100 WBCS
PHOSPHATE SERPL-MCNC: 4.3 MG/DL (ref 2.3–4.1)
PLATELET # BLD AUTO: 169 THOUSANDS/UL (ref 149–390)
PMV BLD AUTO: 11.1 FL (ref 8.9–12.7)
POTASSIUM SERPL-SCNC: 3.8 MMOL/L (ref 3.5–5.3)
RBC # BLD AUTO: 3.43 MILLION/UL (ref 3.81–5.12)
SODIUM SERPL-SCNC: 141 MMOL/L (ref 135–147)
WBC # BLD AUTO: 5.2 THOUSAND/UL (ref 4.31–10.16)

## 2025-07-26 PROCEDURE — 84100 ASSAY OF PHOSPHORUS: CPT

## 2025-07-26 PROCEDURE — 99233 SBSQ HOSP IP/OBS HIGH 50: CPT | Performed by: STUDENT IN AN ORGANIZED HEALTH CARE EDUCATION/TRAINING PROGRAM

## 2025-07-26 PROCEDURE — 94660 CPAP INITIATION&MGMT: CPT

## 2025-07-26 PROCEDURE — 83735 ASSAY OF MAGNESIUM: CPT

## 2025-07-26 PROCEDURE — 94760 N-INVAS EAR/PLS OXIMETRY 1: CPT

## 2025-07-26 PROCEDURE — 82948 REAGENT STRIP/BLOOD GLUCOSE: CPT

## 2025-07-26 PROCEDURE — 80048 BASIC METABOLIC PNL TOTAL CA: CPT

## 2025-07-26 PROCEDURE — 85025 COMPLETE CBC W/AUTO DIFF WBC: CPT

## 2025-07-26 RX ORDER — TORSEMIDE 5 MG/1
5 TABLET ORAL DAILY
Status: DISCONTINUED | OUTPATIENT
Start: 2025-07-27 | End: 2025-07-26

## 2025-07-26 RX ORDER — RIVAROXABAN 15 MG-20MG
KIT ORAL
Qty: 1 EACH | Refills: 0 | Status: SHIPPED | OUTPATIENT
Start: 2025-07-26 | End: 2025-07-27

## 2025-07-26 RX ORDER — TORSEMIDE 5 MG/1
5 TABLET ORAL DAILY
Status: DISCONTINUED | OUTPATIENT
Start: 2025-07-26 | End: 2025-07-27 | Stop reason: HOSPADM

## 2025-07-26 RX ORDER — RIVAROXABAN 15 MG-20MG
KIT ORAL
Qty: 1 EACH | Refills: 0 | Status: SHIPPED | OUTPATIENT
Start: 2025-07-26 | End: 2025-07-26

## 2025-07-26 RX ADMIN — TORSEMIDE 5 MG: 5 TABLET ORAL at 13:18

## 2025-07-26 RX ADMIN — CHOLECALCIFEROL TAB 10 MCG (400 UNIT) 800 UNITS: 10 TAB at 08:05

## 2025-07-26 RX ADMIN — DULOXETINE 30 MG: 30 CAPSULE, DELAYED RELEASE ORAL at 08:04

## 2025-07-26 RX ADMIN — PANTOPRAZOLE SODIUM 40 MG: 40 TABLET, DELAYED RELEASE ORAL at 05:44

## 2025-07-26 RX ADMIN — DICLOFENAC SODIUM 2 G: 10 GEL TOPICAL at 21:28

## 2025-07-26 RX ADMIN — APIXABAN 10 MG: 5 TABLET, FILM COATED ORAL at 08:06

## 2025-07-26 RX ADMIN — VITAM B12 100 MCG: 100 TAB at 08:05

## 2025-07-26 RX ADMIN — APIXABAN 10 MG: 5 TABLET, FILM COATED ORAL at 17:41

## 2025-07-27 VITALS
SYSTOLIC BLOOD PRESSURE: 133 MMHG | HEIGHT: 69 IN | RESPIRATION RATE: 18 BRPM | OXYGEN SATURATION: 89 % | DIASTOLIC BLOOD PRESSURE: 69 MMHG | TEMPERATURE: 98.2 F | BODY MASS INDEX: 40.36 KG/M2 | HEART RATE: 93 BPM | WEIGHT: 272.49 LBS

## 2025-07-27 LAB
ANION GAP SERPL CALCULATED.3IONS-SCNC: 5 MMOL/L (ref 4–13)
BACTERIA BLD CULT: NORMAL
BACTERIA BLD CULT: NORMAL
BUN SERPL-MCNC: 14 MG/DL (ref 5–25)
CALCIUM SERPL-MCNC: 8.5 MG/DL (ref 8.4–10.2)
CHLORIDE SERPL-SCNC: 106 MMOL/L (ref 96–108)
CO2 SERPL-SCNC: 29 MMOL/L (ref 21–32)
CREAT SERPL-MCNC: 0.81 MG/DL (ref 0.6–1.3)
ERYTHROCYTE [DISTWIDTH] IN BLOOD BY AUTOMATED COUNT: 13.8 % (ref 11.6–15.1)
GFR SERPL CREATININE-BSD FRML MDRD: 73 ML/MIN/1.73SQ M
GLUCOSE SERPL-MCNC: 109 MG/DL (ref 65–140)
GLUCOSE SERPL-MCNC: 111 MG/DL (ref 65–140)
GLUCOSE SERPL-MCNC: 124 MG/DL (ref 65–140)
HCT VFR BLD AUTO: 34.4 % (ref 34.8–46.1)
HGB BLD-MCNC: 11.9 G/DL (ref 11.5–15.4)
MCH RBC QN AUTO: 33.8 PG (ref 26.8–34.3)
MCHC RBC AUTO-ENTMCNC: 34.6 G/DL (ref 31.4–37.4)
MCV RBC AUTO: 98 FL (ref 82–98)
PLATELET # BLD AUTO: 185 THOUSANDS/UL (ref 149–390)
PMV BLD AUTO: 11.5 FL (ref 8.9–12.7)
POTASSIUM SERPL-SCNC: 3.9 MMOL/L (ref 3.5–5.3)
RBC # BLD AUTO: 3.52 MILLION/UL (ref 3.81–5.12)
SODIUM SERPL-SCNC: 140 MMOL/L (ref 135–147)
WBC # BLD AUTO: 5.27 THOUSAND/UL (ref 4.31–10.16)

## 2025-07-27 PROCEDURE — 94760 N-INVAS EAR/PLS OXIMETRY 1: CPT

## 2025-07-27 PROCEDURE — 80048 BASIC METABOLIC PNL TOTAL CA: CPT | Performed by: STUDENT IN AN ORGANIZED HEALTH CARE EDUCATION/TRAINING PROGRAM

## 2025-07-27 PROCEDURE — 94660 CPAP INITIATION&MGMT: CPT

## 2025-07-27 PROCEDURE — 82948 REAGENT STRIP/BLOOD GLUCOSE: CPT

## 2025-07-27 PROCEDURE — 85027 COMPLETE CBC AUTOMATED: CPT | Performed by: STUDENT IN AN ORGANIZED HEALTH CARE EDUCATION/TRAINING PROGRAM

## 2025-07-27 PROCEDURE — 99239 HOSP IP/OBS DSCHRG MGMT >30: CPT | Performed by: STUDENT IN AN ORGANIZED HEALTH CARE EDUCATION/TRAINING PROGRAM

## 2025-07-27 RX ADMIN — DULOXETINE 30 MG: 30 CAPSULE, DELAYED RELEASE ORAL at 09:56

## 2025-07-27 RX ADMIN — LOSARTAN POTASSIUM 50 MG: 50 TABLET, FILM COATED ORAL at 09:56

## 2025-07-27 RX ADMIN — CHOLECALCIFEROL TAB 10 MCG (400 UNIT) 800 UNITS: 10 TAB at 09:56

## 2025-07-27 RX ADMIN — PANTOPRAZOLE SODIUM 40 MG: 40 TABLET, DELAYED RELEASE ORAL at 05:38

## 2025-07-27 RX ADMIN — TORSEMIDE 5 MG: 5 TABLET ORAL at 09:56

## 2025-07-27 RX ADMIN — APIXABAN 10 MG: 5 TABLET, FILM COATED ORAL at 09:56

## 2025-07-27 RX ADMIN — DICLOFENAC SODIUM 2 G: 10 GEL TOPICAL at 10:07

## 2025-07-27 RX ADMIN — FERROUS SULFATE TAB 325 MG (65 MG ELEMENTAL FE) 325 MG: 325 (65 FE) TAB at 09:56

## 2025-07-27 RX ADMIN — POLYETHYLENE GLYCOL 3350 17 G: 17 POWDER, FOR SOLUTION ORAL at 09:56

## 2025-07-27 RX ADMIN — HYDROCHLOROTHIAZIDE 12.5 MG: 12.5 TABLET ORAL at 09:56

## 2025-07-27 RX ADMIN — VITAM B12 100 MCG: 100 TAB at 09:56

## 2025-07-28 LAB
BACTERIA BLD CULT: NORMAL
BACTERIA BLD CULT: NORMAL
KCT BLD-ACNC: 148 SEC (ref 89–137)
KCT BLD-ACNC: 219 SEC (ref 89–137)
SPECIMEN SOURCE: ABNORMAL
SPECIMEN SOURCE: ABNORMAL

## (undated) DEVICE — SYRINGE 5ML LL

## (undated) DEVICE — DRAPE SHEET THREE QUARTER

## (undated) DEVICE — NEEDLE SPINAL 22G X 5IN QUINCKE

## (undated) DEVICE — NEEDLE SPINAL 22G X 3.5IN  QUINCKE

## (undated) DEVICE — GAUZE SPONGES,USP TYPE VII GAUZE, 12 PLY: Brand: CURITY

## (undated) DEVICE — PLASTIC ADHESIVE BANDAGE: Brand: CURITY

## (undated) DEVICE — NEEDLE 25G X 1 1/2

## (undated) DEVICE — CHLORAPREP HI-LITE 10.5ML ORANGE

## (undated) DEVICE — GLOVE INDICATOR PI UNDERGLOVE SZ 7.5 BLUE

## (undated) DEVICE — IV EXTENSION TUBING SMALL BORE

## (undated) DEVICE — DRAPE TOWEL: Brand: CONVERTORS

## (undated) DEVICE — NEEDLE BLUNT 18 G X 1 1/2IN

## (undated) DEVICE — SYRINGE 10ML LL

## (undated) DEVICE — UTILITY MARKER,BLACK WITH LABELS: Brand: DEVON